# Patient Record
Sex: FEMALE | Race: WHITE | NOT HISPANIC OR LATINO | URBAN - METROPOLITAN AREA
[De-identification: names, ages, dates, MRNs, and addresses within clinical notes are randomized per-mention and may not be internally consistent; named-entity substitution may affect disease eponyms.]

---

## 2018-01-18 PROBLEM — Z00.00 ENCOUNTER FOR PREVENTIVE HEALTH EXAMINATION: Status: ACTIVE | Noted: 2018-01-18

## 2018-01-23 ENCOUNTER — OUTPATIENT (OUTPATIENT)
Dept: OUTPATIENT SERVICES | Facility: HOSPITAL | Age: 78
LOS: 1 days | End: 2018-01-23
Payer: MEDICARE

## 2018-01-23 ENCOUNTER — APPOINTMENT (OUTPATIENT)
Dept: CV DIAGNOSTICS | Facility: HOSPITAL | Age: 78
End: 2018-01-23

## 2018-01-23 DIAGNOSIS — I25.10 ATHEROSCLEROTIC HEART DISEASE OF NATIVE CORONARY ARTERY WITHOUT ANGINA PECTORIS: ICD-10-CM

## 2018-01-23 PROCEDURE — A9500: CPT

## 2018-01-23 PROCEDURE — 78452 HT MUSCLE IMAGE SPECT MULT: CPT

## 2018-01-23 PROCEDURE — 93016 CV STRESS TEST SUPVJ ONLY: CPT

## 2018-01-23 PROCEDURE — 93017 CV STRESS TEST TRACING ONLY: CPT

## 2018-01-23 PROCEDURE — 93018 CV STRESS TEST I&R ONLY: CPT

## 2018-01-23 PROCEDURE — 78452 HT MUSCLE IMAGE SPECT MULT: CPT | Mod: 26

## 2018-02-12 ENCOUNTER — OUTPATIENT (OUTPATIENT)
Dept: OUTPATIENT SERVICES | Facility: HOSPITAL | Age: 78
LOS: 1 days | End: 2018-02-12
Payer: MEDICARE

## 2018-02-12 VITALS
RESPIRATION RATE: 16 BRPM | OXYGEN SATURATION: 97 % | DIASTOLIC BLOOD PRESSURE: 68 MMHG | SYSTOLIC BLOOD PRESSURE: 163 MMHG | WEIGHT: 117.95 LBS | HEART RATE: 66 BPM | TEMPERATURE: 97 F | HEIGHT: 62 IN

## 2018-02-12 DIAGNOSIS — R93.1 ABNORMAL FINDINGS ON DIAGNOSTIC IMAGING OF HEART AND CORONARY CIRCULATION: ICD-10-CM

## 2018-02-12 LAB
ALBUMIN SERPL ELPH-MCNC: 4.5 G/DL — SIGNIFICANT CHANGE UP (ref 3.3–5)
ALP SERPL-CCNC: 78 U/L — SIGNIFICANT CHANGE UP (ref 40–120)
ALT FLD-CCNC: 14 U/L RC — SIGNIFICANT CHANGE UP (ref 10–45)
ANION GAP SERPL CALC-SCNC: 13 MMOL/L — SIGNIFICANT CHANGE UP (ref 5–17)
AST SERPL-CCNC: 20 U/L — SIGNIFICANT CHANGE UP (ref 10–40)
BILIRUB SERPL-MCNC: 0.5 MG/DL — SIGNIFICANT CHANGE UP (ref 0.2–1.2)
BUN SERPL-MCNC: 19 MG/DL — SIGNIFICANT CHANGE UP (ref 7–23)
CALCIUM SERPL-MCNC: 9.5 MG/DL — SIGNIFICANT CHANGE UP (ref 8.4–10.5)
CHLORIDE SERPL-SCNC: 101 MMOL/L — SIGNIFICANT CHANGE UP (ref 96–108)
CO2 SERPL-SCNC: 28 MMOL/L — SIGNIFICANT CHANGE UP (ref 22–31)
CREAT SERPL-MCNC: 0.68 MG/DL — SIGNIFICANT CHANGE UP (ref 0.5–1.3)
GLUCOSE SERPL-MCNC: 97 MG/DL — SIGNIFICANT CHANGE UP (ref 70–99)
HCT VFR BLD CALC: 42.9 % — SIGNIFICANT CHANGE UP (ref 34.5–45)
HGB BLD-MCNC: 14.4 G/DL — SIGNIFICANT CHANGE UP (ref 11.5–15.5)
MCHC RBC-ENTMCNC: 33.5 GM/DL — SIGNIFICANT CHANGE UP (ref 32–36)
MCHC RBC-ENTMCNC: 33.6 PG — SIGNIFICANT CHANGE UP (ref 27–34)
MCV RBC AUTO: 100 FL — SIGNIFICANT CHANGE UP (ref 80–100)
PLATELET # BLD AUTO: 260 K/UL — SIGNIFICANT CHANGE UP (ref 150–400)
POTASSIUM SERPL-MCNC: 3.9 MMOL/L — SIGNIFICANT CHANGE UP (ref 3.5–5.3)
POTASSIUM SERPL-SCNC: 3.9 MMOL/L — SIGNIFICANT CHANGE UP (ref 3.5–5.3)
PROT SERPL-MCNC: 7.3 G/DL — SIGNIFICANT CHANGE UP (ref 6–8.3)
RBC # BLD: 4.27 M/UL — SIGNIFICANT CHANGE UP (ref 3.8–5.2)
RBC # FLD: 11.6 % — SIGNIFICANT CHANGE UP (ref 10.3–14.5)
SODIUM SERPL-SCNC: 142 MMOL/L — SIGNIFICANT CHANGE UP (ref 135–145)
WBC # BLD: 6.2 K/UL — SIGNIFICANT CHANGE UP (ref 3.8–10.5)
WBC # FLD AUTO: 6.2 K/UL — SIGNIFICANT CHANGE UP (ref 3.8–10.5)

## 2018-02-12 PROCEDURE — C1894: CPT

## 2018-02-12 PROCEDURE — 93458 L HRT ARTERY/VENTRICLE ANGIO: CPT | Mod: 26

## 2018-02-12 PROCEDURE — 80053 COMPREHEN METABOLIC PANEL: CPT

## 2018-02-12 PROCEDURE — C1887: CPT

## 2018-02-12 PROCEDURE — 85027 COMPLETE CBC AUTOMATED: CPT

## 2018-02-12 PROCEDURE — 93010 ELECTROCARDIOGRAM REPORT: CPT

## 2018-02-12 PROCEDURE — 99152 MOD SED SAME PHYS/QHP 5/>YRS: CPT

## 2018-02-12 PROCEDURE — 93005 ELECTROCARDIOGRAM TRACING: CPT

## 2018-02-12 PROCEDURE — C1769: CPT

## 2018-02-12 PROCEDURE — 93458 L HRT ARTERY/VENTRICLE ANGIO: CPT

## 2018-02-12 NOTE — H&P CARDIOLOGY - HISTORY OF PRESENT ILLNESS
78 y/o female PMH HTN, macular degeneration, presents c/o progressive exertional dyspnea. Reports symptoms onset upon climbing 1 flight of stairs. Evaluated by cardiologist (currently seeing NP Meggan Phelps as pt reports Dr. Castaneda is no longer with practice). Stress test showed medium sized moderate defects in the distal anteroseptal, apical, and distal lateral walls that are mostly reversible suggestive of moderate ischemia with partial scarring; EF 42% (see report below).  Echo (10/2017) showed EF 60%, evidence of diastolic dysfunction, mild to moderate MR, mild AR. Referred for cardiac catheterization today.  < from: Nuclear Stress Test-Pharmacologic (01.23.18 @ 16:44) >  IMPRESSIONS:Abnormal Study  * Chest Pain: No chest pain with administration of  Regadenoson.  * Symptom: Flushing sensation.  * HR Response: Appropriate.  * BP Response: Appropriate.  * Heart Rhythm: Sinus Rhythm - Frequent VPD's - 75 BPM.  * Baseline ECG: Nonspecific ST-T wave abnormality.  * ECG Changes: During the initial attempt at exercise  stress, there were approximately 1 mm upsloping ST segment  depressions in leads V4-V6 starting at 6:00 min of  exercise at 104 bpm and persisting at least 5:00 min in  recovery.  During pharmacologic stress, there were  approximately 0.5 mm horizontal ST segment depressions in  leads V4-V6 starting at 1:00 min following regadenoson  infusion at 81 bpm and persisting at least 4:00 min in  recovery.  * Arrhythmia: Frequent VPDs occurredduring rest, stress  and recovery.  Ventricular couplets and periods of  ventricular bigeminy occurred during the initial attempt  at exercise stress.  * The left ventricle was enlarged. There are medium-sized,  moderate defects in the distal anteroseptal, apical, and  distal lateral walls that are mostly reversible suggestive  of moderate ischemia with partial scarring.  * There are large, mild defects in the inferior, basal  inferolateral, and basal to mid inferoseptal walls that  are fixed suggestive of scar.  * Post-stress gated wall motion analysis was performed  (LVEF = 42 %;LVEDV = 139 ml.) revealing hypokinesis of the  inferior, basal to mid inferoseptal, basal inferolateral,  apical, and distal anteroseptal walls.  *** No previous Nuclear/Stress exam.    < end of copied text > 78 y/o female PMH HTN, macular degeneration, presents c/o progressive exertional dyspnea. Reports symptoms onset upon climbing 1 flight of stairs. Evaluated by cardiologist (currently seeing NP Meggan Phelps as pt reports Dr. Castaneda is no longer with practice). Stress test showed medium sized moderate defects in the distal anteroseptal, apical, and distal lateral walls that are mostly reversible suggestive of moderate ischemia with partial scarring; EF 42% (see report below).  Echo (10/2017) showed EF 60%, evidence of diastolic dysfunction, mild to moderate MR, mild AR. Referred for cardiac catheterization today.  < from: Nuclear Stress Test-Pharmacologic (01.23.18 @ 16:44) >  IMPRESSIONS:Abnormal Study  * Chest Pain: No chest pain with administration of  Regadenoson.  * Symptom: Flushing sensation.  * HR Response: Appropriate.  * BP Response: Appropriate.  * Heart Rhythm: Sinus Rhythm - Frequent VPD's - 75 BPM.  * Baseline ECG: Nonspecific ST-T wave abnormality.  * ECG Changes: During the initial attempt at exercise  stress, there were approximately 1 mm upsloping ST segment  depressions in leads V4-V6 starting at 6:00 min of  exercise at 104 bpm and persisting at least 5:00 min in  recovery.  During pharmacologic stress, there were  approximately 0.5 mm horizontal ST segment depressions in  leads V4-V6 starting at 1:00 min following regadenoson  infusion at 81 bpm and persisting at least 4:00 min in  recovery.  * Arrhythmia: Frequent VPDs occurredduring rest, stress  and recovery.  Ventricular couplets and periods of  ventricular bigeminy occurred during the initial attempt  at exercise stress.  * The left ventricle was enlarged. There are medium-sized,  moderate defects in the distal anteroseptal, apical, and  distal lateral walls that are mostly reversible suggestive  of moderate ischemia with partial scarring.  * There are large, mild defects in the inferior, basal  inferolateral, and basal to mid inferoseptal walls that  are fixed suggestive of scar.  * Post-stress gated wall motion analysis was performed  (LVEF = 42 %;LVEDV = 139 ml.) revealing hypokinesis of the  inferior, basal to mid inferoseptal, basal inferolateral,  apical, and distal anteroseptal walls.  *** No previous Nuclear/Stress exam.    < end of copied text >     Antianginals: Metoprolol ER, Amlodipine

## 2018-03-12 ENCOUNTER — APPOINTMENT (OUTPATIENT)
Dept: ELECTROPHYSIOLOGY | Facility: CLINIC | Age: 78
End: 2018-03-12

## 2018-03-12 ENCOUNTER — APPOINTMENT (OUTPATIENT)
Dept: ELECTROPHYSIOLOGY | Facility: CLINIC | Age: 78
End: 2018-03-12
Payer: MEDICARE

## 2018-03-12 ENCOUNTER — NON-APPOINTMENT (OUTPATIENT)
Age: 78
End: 2018-03-12

## 2018-03-12 VITALS
BODY MASS INDEX: 20.14 KG/M2 | HEIGHT: 64 IN | SYSTOLIC BLOOD PRESSURE: 157 MMHG | WEIGHT: 118 LBS | OXYGEN SATURATION: 98 % | HEART RATE: 75 BPM | DIASTOLIC BLOOD PRESSURE: 62 MMHG

## 2018-03-12 DIAGNOSIS — Z87.891 PERSONAL HISTORY OF NICOTINE DEPENDENCE: ICD-10-CM

## 2018-03-12 PROCEDURE — 93000 ELECTROCARDIOGRAM COMPLETE: CPT

## 2018-03-12 PROCEDURE — 99205 OFFICE O/P NEW HI 60 MIN: CPT

## 2018-03-12 RX ORDER — MV-MIN/FA/VIT K/LUTEIN/ZEAXANT 200MCG-5MG
CAPSULE ORAL
Refills: 0 | Status: ACTIVE | COMMUNITY

## 2018-04-09 ENCOUNTER — OUTPATIENT (OUTPATIENT)
Dept: OUTPATIENT SERVICES | Facility: HOSPITAL | Age: 78
LOS: 1 days | End: 2018-04-09
Payer: MEDICARE

## 2018-04-09 VITALS
SYSTOLIC BLOOD PRESSURE: 129 MMHG | WEIGHT: 121.92 LBS | RESPIRATION RATE: 20 BRPM | HEART RATE: 68 BPM | TEMPERATURE: 98 F | OXYGEN SATURATION: 97 % | HEIGHT: 62 IN | DIASTOLIC BLOOD PRESSURE: 57 MMHG

## 2018-04-09 DIAGNOSIS — Z01.818 ENCOUNTER FOR OTHER PREPROCEDURAL EXAMINATION: ICD-10-CM

## 2018-04-09 DIAGNOSIS — I49.9 CARDIAC ARRHYTHMIA, UNSPECIFIED: ICD-10-CM

## 2018-04-09 LAB
ALBUMIN SERPL ELPH-MCNC: 4.2 G/DL — SIGNIFICANT CHANGE UP (ref 3.3–5)
ALP SERPL-CCNC: 75 U/L — SIGNIFICANT CHANGE UP (ref 40–120)
ALT FLD-CCNC: 14 U/L RC — SIGNIFICANT CHANGE UP (ref 10–45)
ANION GAP SERPL CALC-SCNC: 12 MMOL/L — SIGNIFICANT CHANGE UP (ref 5–17)
AST SERPL-CCNC: 16 U/L — SIGNIFICANT CHANGE UP (ref 10–40)
BILIRUB SERPL-MCNC: 0.6 MG/DL — SIGNIFICANT CHANGE UP (ref 0.2–1.2)
BLD GP AB SCN SERPL QL: NEGATIVE — SIGNIFICANT CHANGE UP
BUN SERPL-MCNC: 16 MG/DL — SIGNIFICANT CHANGE UP (ref 7–23)
CALCIUM SERPL-MCNC: 9.5 MG/DL — SIGNIFICANT CHANGE UP (ref 8.4–10.5)
CHLORIDE SERPL-SCNC: 103 MMOL/L — SIGNIFICANT CHANGE UP (ref 96–108)
CO2 SERPL-SCNC: 27 MMOL/L — SIGNIFICANT CHANGE UP (ref 22–31)
CREAT SERPL-MCNC: 0.7 MG/DL — SIGNIFICANT CHANGE UP (ref 0.5–1.3)
GLUCOSE SERPL-MCNC: 90 MG/DL — SIGNIFICANT CHANGE UP (ref 70–99)
HCT VFR BLD CALC: 42 % — SIGNIFICANT CHANGE UP (ref 34.5–45)
HGB BLD-MCNC: 13.9 G/DL — SIGNIFICANT CHANGE UP (ref 11.5–15.5)
INR BLD: 1.11 RATIO — SIGNIFICANT CHANGE UP (ref 0.88–1.16)
MCHC RBC-ENTMCNC: 33 GM/DL — SIGNIFICANT CHANGE UP (ref 32–36)
MCHC RBC-ENTMCNC: 33.5 PG — SIGNIFICANT CHANGE UP (ref 27–34)
MCV RBC AUTO: 101 FL — HIGH (ref 80–100)
PLATELET # BLD AUTO: 280 K/UL — SIGNIFICANT CHANGE UP (ref 150–400)
POTASSIUM SERPL-MCNC: 4.1 MMOL/L — SIGNIFICANT CHANGE UP (ref 3.5–5.3)
POTASSIUM SERPL-SCNC: 4.1 MMOL/L — SIGNIFICANT CHANGE UP (ref 3.5–5.3)
PROT SERPL-MCNC: 7.4 G/DL — SIGNIFICANT CHANGE UP (ref 6–8.3)
PROTHROM AB SERPL-ACNC: 12 SEC — SIGNIFICANT CHANGE UP (ref 9.8–12.7)
RBC # BLD: 4.14 M/UL — SIGNIFICANT CHANGE UP (ref 3.8–5.2)
RBC # FLD: 11.7 % — SIGNIFICANT CHANGE UP (ref 10.3–14.5)
RH IG SCN BLD-IMP: POSITIVE — SIGNIFICANT CHANGE UP
SODIUM SERPL-SCNC: 142 MMOL/L — SIGNIFICANT CHANGE UP (ref 135–145)
WBC # BLD: 5 K/UL — SIGNIFICANT CHANGE UP (ref 3.8–10.5)
WBC # FLD AUTO: 5 K/UL — SIGNIFICANT CHANGE UP (ref 3.8–10.5)

## 2018-04-09 PROCEDURE — 93010 ELECTROCARDIOGRAM REPORT: CPT

## 2018-04-09 RX ORDER — METOPROLOL TARTRATE 50 MG
1 TABLET ORAL
Qty: 0 | Refills: 0 | COMMUNITY

## 2018-04-09 NOTE — H&P CARDIOLOGY - HISTORY OF PRESENT ILLNESS
76 y/o female PMH HTN, macular degeneration, and PVCs. On a holter she had approximately 17,000 PVCs. She is on a beta blocker. She notes feeling fatigue. She denies shortness of breath, dizziness, or syncope. She does note palpitations. She can walk a few blocks before getting short of breath. An echocardiogram shows a normal EF. An angiogram shows no coronary disease. seen & evaluated by cardiologist Dr butler & now recommends for VT ablation. 76 y/o female PMH HTN, macular degeneration, and PVCs. On a holter she had approximately 17,000 PVCs. She is on a beta blocker. She notes feeling fatigue. She denies shortness of breath, dizziness, or syncope. She does note palpitations. She can walk a few blocks before getting short of breath. An echocardiogram shows a normal EF. An angiogram shows no coronary disease. seen & evaluated by cardiologist Dr butler & now recommends for VT ablation on 4/17/18.

## 2018-04-17 ENCOUNTER — INPATIENT (INPATIENT)
Facility: HOSPITAL | Age: 78
LOS: 0 days | Discharge: HOME | DRG: 274 | End: 2018-04-18
Attending: INTERNAL MEDICINE | Admitting: INTERNAL MEDICINE
Payer: MEDICARE

## 2018-04-17 ENCOUNTER — TRANSCRIPTION ENCOUNTER (OUTPATIENT)
Age: 78
End: 2018-04-17

## 2018-04-17 VITALS
RESPIRATION RATE: 16 BRPM | HEART RATE: 81 BPM | OXYGEN SATURATION: 97 % | WEIGHT: 121.92 LBS | HEIGHT: 60 IN | DIASTOLIC BLOOD PRESSURE: 54 MMHG | TEMPERATURE: 98 F | SYSTOLIC BLOOD PRESSURE: 141 MMHG

## 2018-04-17 DIAGNOSIS — I49.9 CARDIAC ARRHYTHMIA, UNSPECIFIED: ICD-10-CM

## 2018-04-17 LAB
BLD GP AB SCN SERPL QL: NEGATIVE — SIGNIFICANT CHANGE UP
RH IG SCN BLD-IMP: POSITIVE — SIGNIFICANT CHANGE UP

## 2018-04-17 PROCEDURE — 93654 COMPRE EP EVAL TX VT: CPT

## 2018-04-17 PROCEDURE — 93662 INTRACARDIAC ECG (ICE): CPT | Mod: 26

## 2018-04-17 PROCEDURE — 93010 ELECTROCARDIOGRAM REPORT: CPT

## 2018-04-17 RX ORDER — ASPIRIN/CALCIUM CARB/MAGNESIUM 324 MG
1 TABLET ORAL
Qty: 30 | Refills: 0
Start: 2018-04-17 | End: 2018-05-16

## 2018-04-17 RX ORDER — ASPIRIN/CALCIUM CARB/MAGNESIUM 324 MG
325 TABLET ORAL DAILY
Qty: 0 | Refills: 0 | Status: DISCONTINUED | OUTPATIENT
Start: 2018-04-17 | End: 2018-04-24

## 2018-04-17 RX ORDER — METOPROLOL TARTRATE 50 MG
75 TABLET ORAL DAILY
Qty: 0 | Refills: 0 | Status: DISCONTINUED | OUTPATIENT
Start: 2018-04-17 | End: 2018-04-18

## 2018-04-17 RX ORDER — MAGNESIUM OXIDE 400 MG ORAL TABLET 241.3 MG
800 TABLET ORAL ONCE
Qty: 0 | Refills: 0 | Status: COMPLETED | OUTPATIENT
Start: 2018-04-17 | End: 2018-04-17

## 2018-04-17 RX ORDER — MULTIVIT-MIN/FERROUS GLUCONATE 9 MG/15 ML
1 LIQUID (ML) ORAL DAILY
Qty: 0 | Refills: 0 | Status: DISCONTINUED | OUTPATIENT
Start: 2018-04-17 | End: 2018-04-24

## 2018-04-17 RX ORDER — MULTIVIT-MIN/FERROUS GLUCONATE 9 MG/15 ML
1 LIQUID (ML) ORAL DAILY
Qty: 0 | Refills: 0 | Status: DISCONTINUED | OUTPATIENT
Start: 2018-04-17 | End: 2018-04-18

## 2018-04-17 RX ORDER — AMLODIPINE BESYLATE 2.5 MG/1
5 TABLET ORAL DAILY
Qty: 0 | Refills: 0 | Status: DISCONTINUED | OUTPATIENT
Start: 2018-04-17 | End: 2018-04-18

## 2018-04-17 RX ORDER — ACETAMINOPHEN 500 MG
650 TABLET ORAL EVERY 6 HOURS
Qty: 0 | Refills: 0 | Status: DISCONTINUED | OUTPATIENT
Start: 2018-04-17 | End: 2018-04-18

## 2018-04-17 RX ORDER — ASPIRIN/CALCIUM CARB/MAGNESIUM 324 MG
325 TABLET ORAL DAILY
Qty: 0 | Refills: 0 | Status: DISCONTINUED | OUTPATIENT
Start: 2018-04-17 | End: 2018-04-18

## 2018-04-17 RX ORDER — AMLODIPINE BESYLATE 2.5 MG/1
5 TABLET ORAL DAILY
Qty: 0 | Refills: 0 | Status: DISCONTINUED | OUTPATIENT
Start: 2018-04-17 | End: 2018-04-24

## 2018-04-17 RX ORDER — METOPROLOL TARTRATE 50 MG
75 TABLET ORAL DAILY
Qty: 0 | Refills: 0 | Status: DISCONTINUED | OUTPATIENT
Start: 2018-04-17 | End: 2018-04-24

## 2018-04-17 RX ADMIN — Medication 325 MILLIGRAM(S): at 15:29

## 2018-04-17 RX ADMIN — MAGNESIUM OXIDE 400 MG ORAL TABLET 800 MILLIGRAM(S): 241.3 TABLET ORAL at 21:19

## 2018-04-17 RX ADMIN — AMLODIPINE BESYLATE 5 MILLIGRAM(S): 2.5 TABLET ORAL at 15:29

## 2018-04-17 RX ADMIN — Medication 75 MILLIGRAM(S): at 15:30

## 2018-04-17 NOTE — DISCHARGE NOTE ADULT - ADDITIONAL INSTRUCTIONS
Follow up with Dr. FADIA Kent on 6/1/18 at 9:15am at [facility]

## 2018-04-17 NOTE — DISCHARGE NOTE ADULT - CARE PROVIDER_API CALL
Jonas Kent), Cardiac Electrophysiology; Cardiovascular Disease  [address], [place] [id number]  Phone: [phone]  Fax: [phone]

## 2018-04-17 NOTE — DISCHARGE NOTE ADULT - PLAN OF CARE
Your heartbeat will be controlled. Your catheter/groin site will be free of infection and severe bleeding. Appointments: please call [phone] for follow-up appointment with your electrophysiology (EP) cardiologist within 2-3 weeks after discharge.   Groin Site Care: You can take shower in 24 hours. Keep the area clean and dry. No submersion of your catheter/groin site in bath tubs, pools, or any water for 1 week.   Activity: No driving for 24 hours. No strenuous activity or heavy lifting more than 10 pounds for 1 week.  You may need to: Quit smoking. Limit your alcohol intake.   Call Your Doctor immediately if you have: A fast or irregular heart beat; lightheadedness; severe chest pain or shortness of breath; fever or chills; bleeding, pain, redness, swelling, warmth or drainage at or near the catheter/groin site at [phone] Your blood pressure will be controlled. Continue with your blood pressure medications; eat a heart healthy diet with low salt diet; exercise regularly (consult with your physician or cardiologist first); maintain a heart healthy weight; if you smoke - quit (A resource to help you stop smoking is the [facility] – phone number [phone].); include healthy ways to manage stress. Continue to follow with your primary care physician or cardiologist.

## 2018-04-17 NOTE — DISCHARGE NOTE ADULT - PATIENT PORTAL LINK FT
You can access the [facility] Patient Portal, offered by [facility], by registering with the following website: [email or web address]

## 2018-04-17 NOTE — DISCHARGE NOTE ADULT - CARE PLAN
Principal Discharge DX:	S/P ablation of ventricular arrhythmia  Goal:	Your heartbeat will be controlled. Your catheter/groin site will be free of infection and severe bleeding.  Assessment and plan of treatment:	Appointments: please call [phone] for follow-up appointment with your electrophysiology (EP) cardiologist within 2-3 weeks after discharge.   Groin Site Care: You can take shower in 24 hours. Keep the area clean and dry. No submersion of your catheter/groin site in bath tubs, pools, or any water for 1 week.   Activity: No driving for 24 hours. No strenuous activity or heavy lifting more than 10 pounds for 1 week.  You may need to: Quit smoking. Limit your alcohol intake.   Call Your Doctor immediately if you have: A fast or irregular heart beat; lightheadedness; severe chest pain or shortness of breath; fever or chills; bleeding, pain, redness, swelling, warmth or drainage at or near the catheter/groin site at [phone]  Secondary Diagnosis:	Essential hypertension  Goal:	Your blood pressure will be controlled.  Assessment and plan of treatment:	Continue with your blood pressure medications; eat a heart healthy diet with low salt diet; exercise regularly (consult with your physician or cardiologist first); maintain a heart healthy weight; if you smoke - quit (A resource to help you stop smoking is the [facility] Tobacco [facility] – phone number [phone].); include healthy ways to manage stress. Continue to follow with your primary care physician or cardiologist.

## 2018-04-17 NOTE — DISCHARGE NOTE ADULT - HOSPITAL COURSE
76 y/o female PMH HTN, macular degeneration, and PVCs. On a holter she had approximately 17,000 PVCs. She is on a beta blocker. She notes feeling fatigue. She denies shortness of breath, dizziness, or syncope. She does note palpitations. She can walk a few blocks before getting short of breath. An echocardiogram shows a normal EF. An angiogram shows no coronary disease. seen & evaluated by cardiologist Dr butler & now recommends for VT ablation on 4/17/18.  Pt s/p VT ablation via R groin. Pt tolerated procedure well and overnight remained uneventful. No c/o chest pain or SOB. Pt is hemodynamically stable, EKG and all lab results reviewed. Insertion/incision site benign, no bleeding or hematoma. Discharge teaching provided to Pt/caretaker and verbalized understanding the instruction. Pt is stable for discharge home as per attending.

## 2018-04-17 NOTE — DISCHARGE NOTE ADULT - MEDICATION SUMMARY - MEDICATIONS TO TAKE
I will START or STAY ON the medications listed below when I get home from the hospital:    aspirin 325 mg oral delayed release tablet  -- 1 tab(s) by mouth once a day MDD:1  -- Indication: For Cardiac arrhythmia    metoprolol succinate 50 mg oral tablet, extended release  -- 1.5 tab(s) by mouth once a day ( total 75 mg )   -- Indication: For Cardiac arrhythmia    amLODIPine 5 mg oral tablet  -- 1 tab(s) by mouth once a day  -- Indication: For hypertension    PreserVision AREDS 2 oral capsule  -- 1 cap(s) by mouth 2 times a day  -- Indication: For supplement

## 2018-04-17 NOTE — PROGRESS NOTE ADULT - SUBJECTIVE AND OBJECTIVE BOX
Pre-op Diagnosis: PVCs    Post-op Diagnosis: Same    Procedure: ablation    Electrophysiologist: Jonas Kent    Assistant: None    Anesthesia: see anesthesia record    Access: right femoral vein and artery    Description: ablation    Complications: None    EBL: < 30 cc    Disposition: Stable    Plan:   Bedrest 6 hours   daily  Continue current medications

## 2018-04-18 VITALS
RESPIRATION RATE: 17 BRPM | DIASTOLIC BLOOD PRESSURE: 69 MMHG | SYSTOLIC BLOOD PRESSURE: 117 MMHG | TEMPERATURE: 98 F | HEART RATE: 77 BPM | OXYGEN SATURATION: 97 %

## 2018-04-18 LAB
ANION GAP SERPL CALC-SCNC: 11 MMOL/L — SIGNIFICANT CHANGE UP (ref 5–17)
BUN SERPL-MCNC: 17 MG/DL — SIGNIFICANT CHANGE UP (ref 7–23)
CALCIUM SERPL-MCNC: 9 MG/DL — SIGNIFICANT CHANGE UP (ref 8.4–10.5)
CHLORIDE SERPL-SCNC: 105 MMOL/L — SIGNIFICANT CHANGE UP (ref 96–108)
CO2 SERPL-SCNC: 25 MMOL/L — SIGNIFICANT CHANGE UP (ref 22–31)
CREAT SERPL-MCNC: 0.63 MG/DL — SIGNIFICANT CHANGE UP (ref 0.5–1.3)
GLUCOSE SERPL-MCNC: 91 MG/DL — SIGNIFICANT CHANGE UP (ref 70–99)
HCT VFR BLD CALC: 39.2 % — SIGNIFICANT CHANGE UP (ref 34.5–45)
HGB BLD-MCNC: 13 G/DL — SIGNIFICANT CHANGE UP (ref 11.5–15.5)
MCHC RBC-ENTMCNC: 33.3 GM/DL — SIGNIFICANT CHANGE UP (ref 32–36)
MCHC RBC-ENTMCNC: 33.6 PG — SIGNIFICANT CHANGE UP (ref 27–34)
MCV RBC AUTO: 101 FL — HIGH (ref 80–100)
PLATELET # BLD AUTO: 224 K/UL — SIGNIFICANT CHANGE UP (ref 150–400)
POTASSIUM SERPL-MCNC: 4.1 MMOL/L — SIGNIFICANT CHANGE UP (ref 3.5–5.3)
POTASSIUM SERPL-SCNC: 4.1 MMOL/L — SIGNIFICANT CHANGE UP (ref 3.5–5.3)
RBC # BLD: 3.89 M/UL — SIGNIFICANT CHANGE UP (ref 3.8–5.2)
RBC # FLD: 11.8 % — SIGNIFICANT CHANGE UP (ref 10.3–14.5)
SODIUM SERPL-SCNC: 141 MMOL/L — SIGNIFICANT CHANGE UP (ref 135–145)
WBC # BLD: 6.4 K/UL — SIGNIFICANT CHANGE UP (ref 3.8–10.5)
WBC # FLD AUTO: 6.4 K/UL — SIGNIFICANT CHANGE UP (ref 3.8–10.5)

## 2018-04-18 PROCEDURE — C1732: CPT

## 2018-04-18 PROCEDURE — 86900 BLOOD TYPING SEROLOGIC ABO: CPT

## 2018-04-18 PROCEDURE — 85027 COMPLETE CBC AUTOMATED: CPT

## 2018-04-18 PROCEDURE — 86850 RBC ANTIBODY SCREEN: CPT

## 2018-04-18 PROCEDURE — 93654 COMPRE EP EVAL TX VT: CPT

## 2018-04-18 PROCEDURE — 93005 ELECTROCARDIOGRAM TRACING: CPT

## 2018-04-18 PROCEDURE — 93010 ELECTROCARDIOGRAM REPORT: CPT

## 2018-04-18 PROCEDURE — 93662 INTRACARDIAC ECG (ICE): CPT

## 2018-04-18 PROCEDURE — C1894: CPT

## 2018-04-18 PROCEDURE — 85610 PROTHROMBIN TIME: CPT

## 2018-04-18 PROCEDURE — 86901 BLOOD TYPING SEROLOGIC RH(D): CPT

## 2018-04-18 PROCEDURE — C1759: CPT

## 2018-04-18 PROCEDURE — 80053 COMPREHEN METABOLIC PANEL: CPT

## 2018-04-18 PROCEDURE — 80048 BASIC METABOLIC PNL TOTAL CA: CPT

## 2018-04-18 RX ADMIN — Medication 325 MILLIGRAM(S): at 05:32

## 2018-04-18 RX ADMIN — AMLODIPINE BESYLATE 5 MILLIGRAM(S): 2.5 TABLET ORAL at 05:31

## 2018-04-18 RX ADMIN — Medication 75 MILLIGRAM(S): at 05:31

## 2018-04-18 NOTE — PROGRESS NOTE ADULT - SUBJECTIVE AND OBJECTIVE BOX
Subjective/Objective:  Patient is a 77y old  Female who presents with a chief complaint of VT ablation (17 Apr 2018 22:55)    Allergies    No Known Allergies    Intolerances      Medications:  acetaminophen   Tablet. 650 milliGRAM(s) Oral every 6 hours PRN  amLODIPine   Tablet 5 milliGRAM(s) Oral daily  aspirin enteric coated 325 milliGRAM(s) Oral daily  metoprolol succinate ER 75 milliGRAM(s) Oral daily  multivitamin/minerals 1 Tablet(s) Oral daily      Review of Systems:   No c/o chest pain or SOB, and all others negative.    Vitals:  T(C): 36.9 (04-17-18 @ 20:35), Max: 36.9 (04-17-18 @ 20:35)  HR: 65 (04-18-18 @ 05:28) (61 - 86)  BP: 113/53 (04-18-18 @ 05:28) (108/47 - 141/54)  BP(mean): --  RR: 16 (04-18-18 @ 05:28) (16 - 17)  SpO2: 95% (04-18-18 @ 05:28) (94% - 98%)  Wt(kg): --  Daily Height in cm: 152.4 (17 Apr 2018 13:35)    Daily   I&O's Summary    17 Apr 2018 07:01  -  18 Apr 2018 06:13  --------------------------------------------------------  IN: 420 mL / OUT: 0 mL / NET: 420 mL      Physical Exam:  Appearance: Pt in NAD, non-toxic  Cardiovascular: S1 S2  Cath Site: No evidence of bleeding or hematoma, Non-tender to palpation, 2+ distal pulses  Respiratory: Clear to auscultation bilaterally  Gastrointestinal: Soft, NT/ND, Bowel Sounds +  Neurologic: Non-focal                          13.0   6.4   )-----------( 224      ( 18 Apr 2018 04:54 )             39.2     04-18    141  |  105  |  17  ----------------------------<  91  4.1   |  25  |  0.63    Ca    9.0      18 Apr 2018 04:54              Lipid panel   Hgb A1c     ECG: SR at HR 66 with PVC's, no significant changes from previous EKG.    Interpretation of Telemetry: SB/SR at HR 50-60's with frequent PVC's.  6 beats WC x1 overnight, but asymptomatic, already on BB.     Assessment/Plan:   S/P VT ablation via R groin. Pt tolerated procedure well and overnight remained uneventful. No c/o chest pain or SOB. Pt is hemodynamically stable, EKG and all lab results reviewed, and unremarkable. Insertion/incision site benign, no bleeding or hematoma. Discharge teaching provided to Pt/caretaker and verbalized understanding the instruction. Pt is stable.   F/U with cardiologist in 1-2 weeks.  Plan to see attending this morning, then may d/c home if stable.  cont assess and monitor.

## 2018-05-09 ENCOUNTER — OUTPATIENT (OUTPATIENT)
Dept: OUTPATIENT SERVICES | Facility: HOSPITAL | Age: 78
LOS: 1 days | End: 2018-05-09
Payer: MEDICARE

## 2018-05-09 ENCOUNTER — APPOINTMENT (OUTPATIENT)
Dept: RADIOLOGY | Facility: IMAGING CENTER | Age: 78
End: 2018-05-09
Payer: MEDICARE

## 2018-05-09 DIAGNOSIS — Z00.8 ENCOUNTER FOR OTHER GENERAL EXAMINATION: ICD-10-CM

## 2018-05-09 PROCEDURE — 71046 X-RAY EXAM CHEST 2 VIEWS: CPT | Mod: 26

## 2018-05-09 PROCEDURE — 71046 X-RAY EXAM CHEST 2 VIEWS: CPT

## 2018-06-04 ENCOUNTER — NON-APPOINTMENT (OUTPATIENT)
Age: 78
End: 2018-06-04

## 2018-06-04 ENCOUNTER — APPOINTMENT (OUTPATIENT)
Dept: ELECTROPHYSIOLOGY | Facility: CLINIC | Age: 78
End: 2018-06-04
Payer: MEDICARE

## 2018-06-04 VITALS
BODY MASS INDEX: 20.14 KG/M2 | HEIGHT: 64 IN | DIASTOLIC BLOOD PRESSURE: 74 MMHG | OXYGEN SATURATION: 98 % | SYSTOLIC BLOOD PRESSURE: 136 MMHG | HEART RATE: 61 BPM | WEIGHT: 118 LBS

## 2018-06-04 DIAGNOSIS — I47.2 VENTRICULAR TACHYCARDIA: ICD-10-CM

## 2018-06-04 PROCEDURE — 93000 ELECTROCARDIOGRAM COMPLETE: CPT

## 2018-06-04 PROCEDURE — 99214 OFFICE O/P EST MOD 30 MIN: CPT

## 2019-05-07 PROBLEM — G40.909 EPILEPSY, UNSPECIFIED, NOT INTRACTABLE, WITHOUT STATUS EPILEPTICUS: Chronic | Status: ACTIVE | Noted: 2018-02-12

## 2019-05-09 PROBLEM — Z86.69 HISTORY OF MACULAR DEGENERATION: Status: RESOLVED | Noted: 2019-05-09 | Resolved: 2019-05-09

## 2019-05-09 PROBLEM — I34.0 SEVERE MITRAL INSUFFICIENCY: Status: ACTIVE | Noted: 2019-05-09

## 2019-05-09 PROBLEM — Z86.79 HISTORY OF HYPERTENSION: Status: RESOLVED | Noted: 2019-05-09 | Resolved: 2019-05-09

## 2019-05-09 RX ORDER — ATORVASTATIN CALCIUM 20 MG/1
20 TABLET, FILM COATED ORAL
Refills: 2 | Status: ACTIVE | COMMUNITY

## 2019-05-09 RX ORDER — ASPIRIN 325 MG/1
325 TABLET, COATED ORAL
Refills: 0 | Status: COMPLETED | COMMUNITY
End: 2019-05-09

## 2019-05-09 RX ORDER — ASPIRIN 81 MG
81 TABLET, DELAYED RELEASE (ENTERIC COATED) ORAL DAILY
Qty: 1 | Refills: 5 | Status: ACTIVE | COMMUNITY

## 2019-05-14 ENCOUNTER — APPOINTMENT (OUTPATIENT)
Dept: CARDIOTHORACIC SURGERY | Facility: CLINIC | Age: 79
End: 2019-05-14
Payer: MEDICARE

## 2019-05-14 VITALS
OXYGEN SATURATION: 97 % | HEIGHT: 64 IN | BODY MASS INDEX: 20.49 KG/M2 | RESPIRATION RATE: 18 BRPM | WEIGHT: 120 LBS | DIASTOLIC BLOOD PRESSURE: 63 MMHG | HEART RATE: 60 BPM | SYSTOLIC BLOOD PRESSURE: 135 MMHG

## 2019-05-14 DIAGNOSIS — Z86.69 PERSONAL HISTORY OF OTHER DISEASES OF THE NERVOUS SYSTEM AND SENSE ORGANS: ICD-10-CM

## 2019-05-14 DIAGNOSIS — Z86.79 PERSONAL HISTORY OF OTHER DISEASES OF THE CIRCULATORY SYSTEM: ICD-10-CM

## 2019-05-14 DIAGNOSIS — I34.0 NONRHEUMATIC MITRAL (VALVE) INSUFFICIENCY: ICD-10-CM

## 2019-05-14 PROCEDURE — 99205 OFFICE O/P NEW HI 60 MIN: CPT

## 2019-05-15 NOTE — CONSULT LETTER
[FreeTextEntry2] : Pascual Hernandez M.D. [FreeTextEntry1] : Please find attached our consultation on your patient, LIZZETTE SANCHEZ \par We take a multidisciplinary team approach to patient care and consider you, the referring physician, an extension of our team. We will maintain an open line of communication with you throughout your patient's treatment course.  \par It is our commitment to provide your patient with the highest quality of advanced therapeutic options. We thank you for allowing us to participate in the care of your patient.\par Please do not hesitate to contact our team with any questions or concerns at [phone].\par  [FreeTextEntry3] : Joao Kent MD, FRCS\par \par [facility] \par Department of Cardiothoracic  Surgery \par Director of Robotic Cardiac Surgery\par Professor of Cardiovascular & Thoracic Surgery\par [employer] \par \par [facility]\par

## 2019-05-15 NOTE — REVIEW OF SYSTEMS
[Eyesight Problems] : eyesight problems [SOB on Exertion] : shortness of breath during exertion [Negative] : Heme/Lymph

## 2019-05-15 NOTE — ASSESSMENT
[FreeTextEntry1] : 78 year old female with history of HTN and mitral valve prolapse presents with severe MR and worsening MO for surgical consult. Dr. Kent reviewed the echocardiogram images with the patient and  her .  Dr. Kent discussed the risks, benefits and alternatives to surgery and the various surgical approaches, minimally invasive versus sternotomy.    Risks include but not limited to death, heart attack, bleeding, stroke, kidney problems and infection.  Dr. Kent quoted a 1% operative mortality and complication risk. He discussed that there is 95% chance that her valve can repaired. Dr. Kent feels the patient will benefit from and is a candidate for minimally invasive mitral valve repair, possible TV repair, possible cryoablation MAZE.  All questions were addressed and the patient agrees to proceed with surgery. \par \par Plan: \par Admit on 5/21 for PST including non contrast chest CT scan, PFT's, Carotids, Labs, EKG\par OR on 5/22\par \par

## 2019-05-15 NOTE — PHYSICAL EXAM
[General Appearance - Alert] : alert [Sclera] : the sclera and conjunctiva were normal [General Appearance - In No Acute Distress] : in no acute distress [PERRL With Normal Accommodation] : pupils were equal in size, round, and reactive to light [Extraocular Movements] : extraocular movements were intact [Oropharynx] : the oropharynx was normal [Outer Ear] : the ears and nose were normal in appearance [Neck Appearance] : the appearance of the neck was normal [Neck Cervical Mass (___cm)] : no neck mass was observed [Jugular Venous Distention Increased] : there was no jugular-venous distention [Thyroid Diffuse Enlargement] : the thyroid was not enlarged [Thyroid Nodule] : there were no palpable thyroid nodules [Auscultation Breath Sounds / Voice Sounds] : lungs were clear to auscultation bilaterally [Apical Impulse] : the apical impulse was normal [Heart Rate And Rhythm] : heart rate was normal and rhythm regular [Examination Of The Chest] : the chest was normal in appearance [Chest Visual Inspection Thoracic Asymmetry] : no chest asymmetry [Diminished Respiratory Excursion] : normal chest expansion [2+] : left 2+ [No Abnormalities] : the abdominal aorta was not enlarged and no bruit was heard [Bowel Sounds] : normal bowel sounds [Abdomen Soft] : soft [Abdomen Tenderness] : non-tender [Abdomen Mass (___ Cm)] : no abdominal mass palpated [No CVA Tenderness] : no ~M costovertebral angle tenderness [No Spinal Tenderness] : no spinal tenderness [Abnormal Walk] : normal gait [Nail Clubbing] : no clubbing  or cyanosis of the fingernails [Musculoskeletal - Swelling] : no joint swelling seen [Motor Tone] : muscle strength and tone were normal [Skin Color & Pigmentation] : normal skin color and pigmentation [Skin Turgor] : normal skin turgor [] : no rash [Deep Tendon Reflexes (DTR)] : deep tendon reflexes were 2+ and symmetric [No Focal Deficits] : no focal deficits [Sensation] : the sensory exam was normal to light touch and pinprick [Oriented To Time, Place, And Person] : oriented to person, place, and time [Impaired Insight] : insight and judgment were intact [Affect] : the affect was normal [FreeTextEntry1] : 3/6  @ apex [Right Carotid Bruit] : no bruit heard over the right carotid [Left Carotid Bruit] : no bruit heard over the left carotid [Right Femoral Bruit] : no bruit heard over the right femoral artery [Left Femoral Bruit] : no bruit heard over the left femoral artery

## 2019-05-15 NOTE — DATA REVIEWED
[FreeTextEntry1] : 4/24/19 Echo: normal LV size and function, LVEF 55-60%, diastolic dysfunction, normal RV size and function, severe LA enlargement, mild AI, severe MR, mild TR, PAS 29 mmHg. \par \par 2/12/18 Cardiac Cath: normal coronaries\par \par 20/2017: Echo stress: mild stress-induced ventricular arrhythmias(frequent PVC's, bigeminy, couplet's and 3 beat NSVT). \par \par 10/30/17 Echo: LVEF 60%, diatolic LV dysfunction, normal RV size and systolic function, severe LA enlargement, mild to moderate MR. Prolapse of the posterior mitral valve leaflet, minimal TR and MD, PAS 25-30 mmHg. \par \par \par

## 2019-05-21 ENCOUNTER — INPATIENT (INPATIENT)
Facility: HOSPITAL | Age: 79
LOS: 7 days | Discharge: HOME | DRG: 220 | End: 2019-05-29
Attending: THORACIC SURGERY (CARDIOTHORACIC VASCULAR SURGERY) | Admitting: THORACIC SURGERY (CARDIOTHORACIC VASCULAR SURGERY)
Payer: MEDICARE

## 2019-05-21 VITALS
DIASTOLIC BLOOD PRESSURE: 67 MMHG | OXYGEN SATURATION: 96 % | SYSTOLIC BLOOD PRESSURE: 144 MMHG | HEART RATE: 60 BPM | RESPIRATION RATE: 16 BRPM

## 2019-05-21 LAB
ALBUMIN SERPL ELPH-MCNC: 4.3 G/DL — SIGNIFICANT CHANGE UP (ref 3.3–5)
ALP SERPL-CCNC: 79 U/L — SIGNIFICANT CHANGE UP (ref 40–120)
ALT FLD-CCNC: 19 U/L — SIGNIFICANT CHANGE UP (ref 10–45)
ANION GAP SERPL CALC-SCNC: 9 MMOL/L — SIGNIFICANT CHANGE UP (ref 5–17)
APPEARANCE UR: CLEAR — SIGNIFICANT CHANGE UP
APTT BLD: 31.8 SEC — SIGNIFICANT CHANGE UP (ref 27.5–36.3)
AST SERPL-CCNC: 21 U/L — SIGNIFICANT CHANGE UP (ref 10–40)
BASOPHILS # BLD AUTO: 0.02 K/UL — SIGNIFICANT CHANGE UP (ref 0–0.2)
BASOPHILS NFR BLD AUTO: 0.3 % — SIGNIFICANT CHANGE UP (ref 0–2)
BILIRUB SERPL-MCNC: 0.4 MG/DL — SIGNIFICANT CHANGE UP (ref 0.2–1.2)
BILIRUB UR-MCNC: NEGATIVE — SIGNIFICANT CHANGE UP
BUN SERPL-MCNC: 17 MG/DL — SIGNIFICANT CHANGE UP (ref 7–23)
CALCIUM SERPL-MCNC: 9 MG/DL — SIGNIFICANT CHANGE UP (ref 8.4–10.5)
CHLORIDE SERPL-SCNC: 105 MMOL/L — SIGNIFICANT CHANGE UP (ref 96–108)
CO2 SERPL-SCNC: 28 MMOL/L — SIGNIFICANT CHANGE UP (ref 22–31)
COLOR SPEC: YELLOW — SIGNIFICANT CHANGE UP
CREAT SERPL-MCNC: 0.58 MG/DL — SIGNIFICANT CHANGE UP (ref 0.5–1.3)
DIFF PNL FLD: ABNORMAL
EOSINOPHIL # BLD AUTO: 0.05 K/UL — SIGNIFICANT CHANGE UP (ref 0–0.5)
EOSINOPHIL NFR BLD AUTO: 0.8 % — SIGNIFICANT CHANGE UP (ref 0–6)
GLUCOSE SERPL-MCNC: 97 MG/DL — SIGNIFICANT CHANGE UP (ref 70–99)
GLUCOSE UR QL: NEGATIVE — SIGNIFICANT CHANGE UP
HBA1C BLD-MCNC: 5.6 % — SIGNIFICANT CHANGE UP (ref 4–5.6)
HCT VFR BLD CALC: 40.4 % — SIGNIFICANT CHANGE UP (ref 34.5–45)
HGB BLD-MCNC: 13 G/DL — SIGNIFICANT CHANGE UP (ref 11.5–15.5)
IMM GRANULOCYTES NFR BLD AUTO: 0.3 % — SIGNIFICANT CHANGE UP (ref 0–1.5)
INR BLD: 0.99 — SIGNIFICANT CHANGE UP (ref 0.88–1.16)
KETONES UR-MCNC: NEGATIVE — SIGNIFICANT CHANGE UP
LEUKOCYTE ESTERASE UR-ACNC: NEGATIVE — SIGNIFICANT CHANGE UP
LYMPHOCYTES # BLD AUTO: 1.73 K/UL — SIGNIFICANT CHANGE UP (ref 1–3.3)
LYMPHOCYTES # BLD AUTO: 27.2 % — SIGNIFICANT CHANGE UP (ref 13–44)
MCHC RBC-ENTMCNC: 32.2 GM/DL — SIGNIFICANT CHANGE UP (ref 32–36)
MCHC RBC-ENTMCNC: 32.7 PG — SIGNIFICANT CHANGE UP (ref 27–34)
MCV RBC AUTO: 101.5 FL — HIGH (ref 80–100)
MONOCYTES # BLD AUTO: 0.58 K/UL — SIGNIFICANT CHANGE UP (ref 0–0.9)
MONOCYTES NFR BLD AUTO: 9.1 % — SIGNIFICANT CHANGE UP (ref 2–14)
NEUTROPHILS # BLD AUTO: 3.95 K/UL — SIGNIFICANT CHANGE UP (ref 1.8–7.4)
NEUTROPHILS NFR BLD AUTO: 62.3 % — SIGNIFICANT CHANGE UP (ref 43–77)
NITRITE UR-MCNC: NEGATIVE — SIGNIFICANT CHANGE UP
NRBC # BLD: 0 /100 WBCS — SIGNIFICANT CHANGE UP (ref 0–0)
NT-PROBNP SERPL-SCNC: 682 PG/ML — HIGH (ref 0–300)
PH UR: 7 — SIGNIFICANT CHANGE UP (ref 5–8)
PLATELET # BLD AUTO: 253 K/UL — SIGNIFICANT CHANGE UP (ref 150–400)
POTASSIUM SERPL-MCNC: 4.2 MMOL/L — SIGNIFICANT CHANGE UP (ref 3.5–5.3)
POTASSIUM SERPL-SCNC: 4.2 MMOL/L — SIGNIFICANT CHANGE UP (ref 3.5–5.3)
PREALB SERPL-MCNC: 25 MG/DL — SIGNIFICANT CHANGE UP (ref 20–40)
PROT SERPL-MCNC: 7.1 G/DL — SIGNIFICANT CHANGE UP (ref 6–8.3)
PROT UR-MCNC: NEGATIVE MG/DL — SIGNIFICANT CHANGE UP
PROTHROM AB SERPL-ACNC: 11.2 SEC — SIGNIFICANT CHANGE UP (ref 10–12.9)
RBC # BLD: 3.98 M/UL — SIGNIFICANT CHANGE UP (ref 3.8–5.2)
RBC # FLD: 13.4 % — SIGNIFICANT CHANGE UP (ref 10.3–14.5)
SODIUM SERPL-SCNC: 142 MMOL/L — SIGNIFICANT CHANGE UP (ref 135–145)
SP GR SPEC: 1.01 — SIGNIFICANT CHANGE UP (ref 1–1.03)
TSH SERPL-MCNC: 0.75 UIU/ML — SIGNIFICANT CHANGE UP (ref 0.35–4.94)
UROBILINOGEN FLD QL: 0.2 E.U./DL — SIGNIFICANT CHANGE UP
WBC # BLD: 6.35 K/UL — SIGNIFICANT CHANGE UP (ref 3.8–10.5)
WBC # FLD AUTO: 6.35 K/UL — SIGNIFICANT CHANGE UP (ref 3.8–10.5)

## 2019-05-21 PROCEDURE — 93880 EXTRACRANIAL BILAT STUDY: CPT | Mod: 26

## 2019-05-21 PROCEDURE — 71046 X-RAY EXAM CHEST 2 VIEWS: CPT | Mod: 26

## 2019-05-21 PROCEDURE — 71250 CT THORAX DX C-: CPT | Mod: 26

## 2019-05-21 RX ORDER — HEPARIN SODIUM 5000 [USP'U]/ML
5000 INJECTION INTRAVENOUS; SUBCUTANEOUS EVERY 8 HOURS
Refills: 0 | Status: DISCONTINUED | OUTPATIENT
Start: 2019-05-21 | End: 2019-05-22

## 2019-05-21 RX ORDER — ATORVASTATIN CALCIUM 80 MG/1
20 TABLET, FILM COATED ORAL AT BEDTIME
Refills: 0 | Status: DISCONTINUED | OUTPATIENT
Start: 2019-05-21 | End: 2019-05-22

## 2019-05-21 RX ORDER — CHLORHEXIDINE GLUCONATE 213 G/1000ML
1 SOLUTION TOPICAL ONCE
Refills: 0 | Status: COMPLETED | OUTPATIENT
Start: 2019-05-21 | End: 2019-05-21

## 2019-05-21 RX ORDER — PANTOPRAZOLE SODIUM 20 MG/1
40 TABLET, DELAYED RELEASE ORAL
Refills: 0 | Status: DISCONTINUED | OUTPATIENT
Start: 2019-05-21 | End: 2019-05-22

## 2019-05-21 RX ORDER — ASPIRIN/CALCIUM CARB/MAGNESIUM 324 MG
81 TABLET ORAL DAILY
Refills: 0 | Status: DISCONTINUED | OUTPATIENT
Start: 2019-05-21 | End: 2019-05-22

## 2019-05-21 RX ORDER — CHLORHEXIDINE GLUCONATE 213 G/1000ML
5 SOLUTION TOPICAL ONCE
Refills: 0 | Status: COMPLETED | OUTPATIENT
Start: 2019-05-21 | End: 2019-05-22

## 2019-05-21 RX ORDER — METOPROLOL TARTRATE 50 MG
25 TABLET ORAL EVERY 12 HOURS
Refills: 0 | Status: DISCONTINUED | OUTPATIENT
Start: 2019-05-21 | End: 2019-05-22

## 2019-05-21 RX ORDER — CHLORHEXIDINE GLUCONATE 213 G/1000ML
1 SOLUTION TOPICAL ONCE
Refills: 0 | Status: COMPLETED | OUTPATIENT
Start: 2019-05-22 | End: 2019-05-22

## 2019-05-21 RX ORDER — SODIUM CHLORIDE 9 MG/ML
3 INJECTION INTRAMUSCULAR; INTRAVENOUS; SUBCUTANEOUS EVERY 8 HOURS
Refills: 0 | Status: DISCONTINUED | OUTPATIENT
Start: 2019-05-21 | End: 2019-05-22

## 2019-05-21 RX ADMIN — ATORVASTATIN CALCIUM 20 MILLIGRAM(S): 80 TABLET, FILM COATED ORAL at 22:52

## 2019-05-21 RX ADMIN — CHLORHEXIDINE GLUCONATE 1 APPLICATION(S): 213 SOLUTION TOPICAL at 20:00

## 2019-05-21 RX ADMIN — SODIUM CHLORIDE 3 MILLILITER(S): 9 INJECTION INTRAMUSCULAR; INTRAVENOUS; SUBCUTANEOUS at 22:11

## 2019-05-21 RX ADMIN — Medication 25 MILLIGRAM(S): at 19:22

## 2019-05-21 RX ADMIN — HEPARIN SODIUM 5000 UNIT(S): 5000 INJECTION INTRAVENOUS; SUBCUTANEOUS at 22:35

## 2019-05-21 RX ADMIN — CHLORHEXIDINE GLUCONATE 1 APPLICATION(S): 213 SOLUTION TOPICAL at 22:34

## 2019-05-21 RX ADMIN — SODIUM CHLORIDE 3 MILLILITER(S): 9 INJECTION INTRAMUSCULAR; INTRAVENOUS; SUBCUTANEOUS at 14:39

## 2019-05-21 RX ADMIN — Medication 81 MILLIGRAM(S): at 19:22

## 2019-05-21 NOTE — H&P ADULT - HISTORY OF PRESENT ILLNESS
77 y/o female with PMHx of HTN, s/p ablation at [facility] in 2018 for >17,000 PVC on holter monitor now presents for her known mitral valve regurgitation/prolapse. Patient reports worsening MO and exercise tolerance of 2 blocks. She had a + stress test in Jan 2018 and a LHC on 2/12/18 showing normal coronary arteries. Her last echo on 4/24 showed severe MR with prolapse of the posterior mitral leaflet. and severe eccentric MR, and mild TR, normal LV funciton and severe LA enlargement. the degree of MR had significantly worsened from echo's done in 2018 and 2017. She was seen by Dr. Kent in the office and was found to be a surgical candidate. She now presents for her completion of her preoperative workup including carotid duples, xray, non contrast chest CT. She states that she has been in her usual state of health.... 77 y/o female with PMHx of HTN, s/p ablation at [facility] in 2018 for >17,000 PVC on holter monitor now presents for her known mitral valve regurgitation/prolapse. Patient reports worsening MO and exercise tolerance of 2 blocks. She had a + stress test in Jan 2018 and a LHC on 2/12/18 showing normal coronary arteries. Her last echo on 4/24 showed severe MR with prolapse of the posterior mitral leaflet. and severe eccentric MR, and mild TR, normal LV funciton and severe LA enlargement. the degree of MR had significantly worsened from echo's done in 2018 and 2017. She was seen by Dr. Kent in the office and was found to be a surgical candidate. She now presents for her completion of her preoperative workup including carotid duples, xray, non contrast chest CT. She states that she has been in her usual state of health. She states that she has some fatigue and MO after walking a few blocks. She denies any CP, palpitations, orthopnea, LE edema, fevers/chills, nausea/vomiting.

## 2019-05-21 NOTE — H&P ADULT - NSHPREVIEWOFSYSTEMS_GEN_ALL_CORE
Review of Systems  CONSTITUTIONAL:  Denies Fevers / chills, sweats, fatigue, weight loss, weight gain                                      NEURO:  Denies parathesias, seizures, syncope, confusion                                                                                EYES:  Denies Blurry vision, discharge, pain, loss of vision                                                                                    ENMT:  Denies Difficulty hearing, vertigo, dysphagia, epistaxis, recent dental work                                       CV:  Denies Chest pain, palpitations, MO, orthopnea                                                                                          RESPIRATORY:  Denies Wheezing, SOB, cough / sputum, hemoptysis                                                                GI:  Denies Nausea, vommiting, diarrhea, constipation, melena, difficulty swallowing                                               : Denies Hematuria, dysuria, urgency, incontinence                                                                                         MUSKULOSKELETAL:  Denies arthritis, joint swelling, muscle weakness                                                             SKIN/BREAST:  Denies rash, itching, selvin loss, masses                                                                                            PSYCH:  Denies depresion, anxiety, suicidal ideation                                                                                               HEME/LYMPH:  Denies bruises easily, enlarged lymph nodes, tender lymph nodes                                        ENDOCRINE:  Denies cold intolerance, heat intolerance, polydipsia

## 2019-05-21 NOTE — H&P ADULT - ASSESSMENT
77 y/o female with PMHx of HTN, s/p ablation at [facility] in 2018 for >17,000 PVC on holter monitor now presents for her known mitral valve regurgitation/prolapse. Patient reports worsening MO and exercise tolerance of 2 blocks. She had a + stress test in Jan 2018 and a LHC on 2/12/18 showing normal coronary arteries. Her last echo on 4/24 showed severe MR with prolapse of the posterior mitral leaflet. and severe eccentric MR, and mild TR, normal LV funciton and severe LA enlargement. the degree of MR had significantly worsened from echo's done in 2018 and 2017. She was seen by Dr. Kent in the office and was found to be a surgical candidate. She now presents for her completion of her preoperative workup including carotid duples, xray, non contrast chest CT    - CT chest, carotid doppler, xray, EKG performed  - labs reviewed   - consent obtained  - T& S performed, blood products on hold  - for MV Repair/replacement, possible tricuspid valve, possible cryomaze in AM, possible mini sternotomy

## 2019-05-21 NOTE — H&P ADULT - NSICDXPASTMEDICALHX_GEN_ALL_CORE_FT
PAST MEDICAL HISTORY:  Arrhythmia     Epilepsy     Former smoker     HTN (hypertension)     Macular degeneration

## 2019-05-21 NOTE — H&P ADULT - NSHPSOCIALHISTORY_GEN_ALL_CORE
hx of smoking quit 45 years ago. 5 PY history, no alcohol use, no illicit drug use   no assistive device  lives with

## 2019-05-21 NOTE — PATIENT PROFILE ADULT - NSPROMEDSBROUGHTTOHOSP_GEN_A_NUR
Shana Patel as her PCP. yes

## 2019-05-21 NOTE — H&P ADULT - NSHPPHYSICALEXAM_GEN_ALL_CORE
Physical Exam  CONSTITUTIONAL:                                                              WNL  NEURO:                                                                       WNL                      EYES:                                                                                WNL  ENMT:                                                                               WNL  CV:                                                                                   WNL  RESPIRATORY:                                                                 WNL  GI:                                                                                     WNL  : FITZPATRICK + / -                                                                  WNL  MUSKULOSKELETAL:                                                       WNL  SKIN / BREAST:                                                                  WNL Physical Exam  CONSTITUTIONAL: NAD, stable   NEURO: A&O x3, no focal deficits                EYES:  EOMI, PERRLA,   ENMT:  normocephalic, atraumatic, no JVD, no carotid bruit  CV: RRR, +I/ VI systolic murmur  RESPIRATORY:   CTA b/l  GI: +BS, soft, nontender  : no zhong   MUSKULOSKELETAL: RUE unable to straighten arm 2/2 arthritis, 5/5 strength UE and LE   SKIN / BREAST: no lacerations/abrasions  Vasc: no LE edema b/l 2+ pulses UE and LE expect PT 1+

## 2019-05-22 ENCOUNTER — APPOINTMENT (OUTPATIENT)
Dept: CARDIOTHORACIC SURGERY | Facility: HOSPITAL | Age: 79
End: 2019-05-22

## 2019-05-22 LAB
ALBUMIN SERPL ELPH-MCNC: 2.7 G/DL — LOW (ref 3.3–5)
ALBUMIN SERPL ELPH-MCNC: 3.8 G/DL — SIGNIFICANT CHANGE UP (ref 3.3–5)
ALBUMIN SERPL ELPH-MCNC: 3.9 G/DL — SIGNIFICANT CHANGE UP (ref 3.3–5)
ALBUMIN SERPL ELPH-MCNC: 4.1 G/DL — SIGNIFICANT CHANGE UP (ref 3.3–5)
ALP SERPL-CCNC: 44 U/L — SIGNIFICANT CHANGE UP (ref 40–120)
ALP SERPL-CCNC: 47 U/L — SIGNIFICANT CHANGE UP (ref 40–120)
ALP SERPL-CCNC: 48 U/L — SIGNIFICANT CHANGE UP (ref 40–120)
ALP SERPL-CCNC: 53 U/L — SIGNIFICANT CHANGE UP (ref 40–120)
ALT FLD-CCNC: 19 U/L — SIGNIFICANT CHANGE UP (ref 10–45)
ALT FLD-CCNC: 20 U/L — SIGNIFICANT CHANGE UP (ref 10–45)
ALT FLD-CCNC: 22 U/L — SIGNIFICANT CHANGE UP (ref 10–45)
ALT FLD-CCNC: 24 U/L — SIGNIFICANT CHANGE UP (ref 10–45)
ANION GAP SERPL CALC-SCNC: 10 MMOL/L — SIGNIFICANT CHANGE UP (ref 5–17)
ANION GAP SERPL CALC-SCNC: 10 MMOL/L — SIGNIFICANT CHANGE UP (ref 5–17)
ANION GAP SERPL CALC-SCNC: 11 MMOL/L — SIGNIFICANT CHANGE UP (ref 5–17)
ANION GAP SERPL CALC-SCNC: 12 MMOL/L — SIGNIFICANT CHANGE UP (ref 5–17)
APTT BLD: 173.2 SEC — CRITICAL HIGH (ref 27.5–36.3)
APTT BLD: 27.6 SEC — SIGNIFICANT CHANGE UP (ref 27.5–36.3)
APTT BLD: 29.8 SEC — SIGNIFICANT CHANGE UP (ref 27.5–36.3)
APTT BLD: 34.3 SEC — SIGNIFICANT CHANGE UP (ref 27.5–36.3)
AST SERPL-CCNC: 73 U/L — HIGH (ref 10–40)
AST SERPL-CCNC: 81 U/L — HIGH (ref 10–40)
AST SERPL-CCNC: 83 U/L — HIGH (ref 10–40)
AST SERPL-CCNC: 88 U/L — HIGH (ref 10–40)
BASOPHILS # BLD AUTO: 0.02 K/UL — SIGNIFICANT CHANGE UP (ref 0–0.2)
BASOPHILS # BLD AUTO: 0.04 K/UL — SIGNIFICANT CHANGE UP (ref 0–0.2)
BASOPHILS NFR BLD AUTO: 0.2 % — SIGNIFICANT CHANGE UP (ref 0–2)
BASOPHILS NFR BLD AUTO: 0.4 % — SIGNIFICANT CHANGE UP (ref 0–2)
BILIRUB DIRECT SERPL-MCNC: 0.2 MG/DL — SIGNIFICANT CHANGE UP (ref 0–0.2)
BILIRUB INDIRECT FLD-MCNC: 0.8 MG/DL — SIGNIFICANT CHANGE UP (ref 0.2–1)
BILIRUB SERPL-MCNC: 0.5 MG/DL — SIGNIFICANT CHANGE UP (ref 0.2–1.2)
BILIRUB SERPL-MCNC: 0.8 MG/DL — SIGNIFICANT CHANGE UP (ref 0.2–1.2)
BILIRUB SERPL-MCNC: 0.9 MG/DL — SIGNIFICANT CHANGE UP (ref 0.2–1.2)
BILIRUB SERPL-MCNC: 1 MG/DL — SIGNIFICANT CHANGE UP (ref 0.2–1.2)
BUN SERPL-MCNC: 11 MG/DL — SIGNIFICANT CHANGE UP (ref 7–23)
BUN SERPL-MCNC: 11 MG/DL — SIGNIFICANT CHANGE UP (ref 7–23)
BUN SERPL-MCNC: 12 MG/DL — SIGNIFICANT CHANGE UP (ref 7–23)
BUN SERPL-MCNC: 12 MG/DL — SIGNIFICANT CHANGE UP (ref 7–23)
CALCIUM SERPL-MCNC: 7.7 MG/DL — LOW (ref 8.4–10.5)
CALCIUM SERPL-MCNC: 7.9 MG/DL — LOW (ref 8.4–10.5)
CALCIUM SERPL-MCNC: 7.9 MG/DL — LOW (ref 8.4–10.5)
CALCIUM SERPL-MCNC: 8.2 MG/DL — LOW (ref 8.4–10.5)
CHLORIDE SERPL-SCNC: 104 MMOL/L — SIGNIFICANT CHANGE UP (ref 96–108)
CHLORIDE SERPL-SCNC: 104 MMOL/L — SIGNIFICANT CHANGE UP (ref 96–108)
CHLORIDE SERPL-SCNC: 106 MMOL/L — SIGNIFICANT CHANGE UP (ref 96–108)
CHLORIDE SERPL-SCNC: 108 MMOL/L — SIGNIFICANT CHANGE UP (ref 96–108)
CO2 SERPL-SCNC: 24 MMOL/L — SIGNIFICANT CHANGE UP (ref 22–31)
CO2 SERPL-SCNC: 25 MMOL/L — SIGNIFICANT CHANGE UP (ref 22–31)
CO2 SERPL-SCNC: 25 MMOL/L — SIGNIFICANT CHANGE UP (ref 22–31)
CO2 SERPL-SCNC: 27 MMOL/L — SIGNIFICANT CHANGE UP (ref 22–31)
CREAT SERPL-MCNC: 0.46 MG/DL — LOW (ref 0.5–1.3)
CREAT SERPL-MCNC: 0.46 MG/DL — LOW (ref 0.5–1.3)
CREAT SERPL-MCNC: 0.49 MG/DL — LOW (ref 0.5–1.3)
CREAT SERPL-MCNC: 0.52 MG/DL — SIGNIFICANT CHANGE UP (ref 0.5–1.3)
EOSINOPHIL # BLD AUTO: 0.05 K/UL — SIGNIFICANT CHANGE UP (ref 0–0.5)
EOSINOPHIL # BLD AUTO: 0.09 K/UL — SIGNIFICANT CHANGE UP (ref 0–0.5)
EOSINOPHIL NFR BLD AUTO: 0.2 % — SIGNIFICANT CHANGE UP (ref 0–6)
EOSINOPHIL NFR BLD AUTO: 1.6 % — SIGNIFICANT CHANGE UP (ref 0–6)
GAS PNL BLDA: SIGNIFICANT CHANGE UP
GLUCOSE BLDC GLUCOMTR-MCNC: 101 MG/DL — HIGH (ref 70–99)
GLUCOSE BLDC GLUCOMTR-MCNC: 115 MG/DL — HIGH (ref 70–99)
GLUCOSE BLDC GLUCOMTR-MCNC: 119 MG/DL — HIGH (ref 70–99)
GLUCOSE BLDC GLUCOMTR-MCNC: 137 MG/DL — HIGH (ref 70–99)
GLUCOSE BLDC GLUCOMTR-MCNC: 144 MG/DL — HIGH (ref 70–99)
GLUCOSE BLDC GLUCOMTR-MCNC: 149 MG/DL — HIGH (ref 70–99)
GLUCOSE BLDC GLUCOMTR-MCNC: 91 MG/DL — SIGNIFICANT CHANGE UP (ref 70–99)
GLUCOSE BLDC GLUCOMTR-MCNC: 96 MG/DL — SIGNIFICANT CHANGE UP (ref 70–99)
GLUCOSE BLDC GLUCOMTR-MCNC: 96 MG/DL — SIGNIFICANT CHANGE UP (ref 70–99)
GLUCOSE SERPL-MCNC: 119 MG/DL — HIGH (ref 70–99)
GLUCOSE SERPL-MCNC: 127 MG/DL — HIGH (ref 70–99)
GLUCOSE SERPL-MCNC: 147 MG/DL — HIGH (ref 70–99)
GLUCOSE SERPL-MCNC: 148 MG/DL — HIGH (ref 70–99)
HCT VFR BLD CALC: 25.8 % — LOW (ref 34.5–45)
HCT VFR BLD CALC: 26.2 % — LOW (ref 34.5–45)
HCT VFR BLD CALC: 29 % — LOW (ref 34.5–45)
HCT VFR BLD CALC: 30.2 % — LOW (ref 34.5–45)
HCT VFR BLD CALC: 38.8 % — SIGNIFICANT CHANGE UP (ref 34.5–45)
HGB BLD-MCNC: 12.5 G/DL — SIGNIFICANT CHANGE UP (ref 11.5–15.5)
HGB BLD-MCNC: 8.3 G/DL — LOW (ref 11.5–15.5)
HGB BLD-MCNC: 8.5 G/DL — LOW (ref 11.5–15.5)
HGB BLD-MCNC: 9.4 G/DL — LOW (ref 11.5–15.5)
HGB BLD-MCNC: 9.7 G/DL — LOW (ref 11.5–15.5)
IMM GRANULOCYTES NFR BLD AUTO: 0.2 % — SIGNIFICANT CHANGE UP (ref 0–1.5)
IMM GRANULOCYTES NFR BLD AUTO: 0.8 % — SIGNIFICANT CHANGE UP (ref 0–1.5)
INR BLD: 1.26 — HIGH (ref 0.88–1.16)
INR BLD: 1.28 — HIGH (ref 0.88–1.16)
INR BLD: 1.38 — HIGH (ref 0.88–1.16)
INR BLD: 1.38 — HIGH (ref 0.88–1.16)
LACTATE SERPL-SCNC: 0.9 MMOL/L — SIGNIFICANT CHANGE UP (ref 0.5–2)
LACTATE SERPL-SCNC: 1.2 MMOL/L — SIGNIFICANT CHANGE UP (ref 0.5–2)
LACTATE SERPL-SCNC: 1.4 MMOL/L — SIGNIFICANT CHANGE UP (ref 0.5–2)
LYMPHOCYTES # BLD AUTO: 1.74 K/UL — SIGNIFICANT CHANGE UP (ref 1–3.3)
LYMPHOCYTES # BLD AUTO: 1.92 K/UL — SIGNIFICANT CHANGE UP (ref 1–3.3)
LYMPHOCYTES # BLD AUTO: 34.7 % — SIGNIFICANT CHANGE UP (ref 13–44)
LYMPHOCYTES # BLD AUTO: 8.1 % — LOW (ref 13–44)
MAGNESIUM SERPL-MCNC: 2.4 MG/DL — SIGNIFICANT CHANGE UP (ref 1.6–2.6)
MAGNESIUM SERPL-MCNC: 2.6 MG/DL — SIGNIFICANT CHANGE UP (ref 1.6–2.6)
MAGNESIUM SERPL-MCNC: 2.8 MG/DL — HIGH (ref 1.6–2.6)
MAGNESIUM SERPL-MCNC: 3.4 MG/DL — HIGH (ref 1.6–2.6)
MCHC RBC-ENTMCNC: 32.1 GM/DL — SIGNIFICANT CHANGE UP (ref 32–36)
MCHC RBC-ENTMCNC: 32.2 GM/DL — SIGNIFICANT CHANGE UP (ref 32–36)
MCHC RBC-ENTMCNC: 32.2 GM/DL — SIGNIFICANT CHANGE UP (ref 32–36)
MCHC RBC-ENTMCNC: 32.4 GM/DL — SIGNIFICANT CHANGE UP (ref 32–36)
MCHC RBC-ENTMCNC: 32.4 GM/DL — SIGNIFICANT CHANGE UP (ref 32–36)
MCHC RBC-ENTMCNC: 32.5 PG — SIGNIFICANT CHANGE UP (ref 27–34)
MCHC RBC-ENTMCNC: 32.9 PG — SIGNIFICANT CHANGE UP (ref 27–34)
MCHC RBC-ENTMCNC: 33 PG — SIGNIFICANT CHANGE UP (ref 27–34)
MCV RBC AUTO: 100.3 FL — HIGH (ref 80–100)
MCV RBC AUTO: 101.6 FL — HIGH (ref 80–100)
MCV RBC AUTO: 102.4 FL — HIGH (ref 80–100)
MONOCYTES # BLD AUTO: 0.5 K/UL — SIGNIFICANT CHANGE UP (ref 0–0.9)
MONOCYTES # BLD AUTO: 1.34 K/UL — HIGH (ref 0–0.9)
MONOCYTES NFR BLD AUTO: 6.3 % — SIGNIFICANT CHANGE UP (ref 2–14)
MONOCYTES NFR BLD AUTO: 9 % — SIGNIFICANT CHANGE UP (ref 2–14)
NEUTROPHILS # BLD AUTO: 18.03 K/UL — HIGH (ref 1.8–7.4)
NEUTROPHILS # BLD AUTO: 3 K/UL — SIGNIFICANT CHANGE UP (ref 1.8–7.4)
NEUTROPHILS NFR BLD AUTO: 54.1 % — SIGNIFICANT CHANGE UP (ref 43–77)
NEUTROPHILS NFR BLD AUTO: 84.4 % — HIGH (ref 43–77)
NRBC # BLD: 0 /100 WBCS — SIGNIFICANT CHANGE UP (ref 0–0)
PHOSPHATE SERPL-MCNC: 2 MG/DL — LOW (ref 2.5–4.5)
PHOSPHATE SERPL-MCNC: 2.2 MG/DL — LOW (ref 2.5–4.5)
PHOSPHATE SERPL-MCNC: 2.7 MG/DL — SIGNIFICANT CHANGE UP (ref 2.5–4.5)
PLATELET # BLD AUTO: 125 K/UL — LOW (ref 150–400)
PLATELET # BLD AUTO: 127 K/UL — LOW (ref 150–400)
PLATELET # BLD AUTO: 139 K/UL — LOW (ref 150–400)
PLATELET # BLD AUTO: 145 K/UL — LOW (ref 150–400)
PLATELET # BLD AUTO: 236 K/UL — SIGNIFICANT CHANGE UP (ref 150–400)
POTASSIUM SERPL-MCNC: 3.9 MMOL/L — SIGNIFICANT CHANGE UP (ref 3.5–5.3)
POTASSIUM SERPL-MCNC: 4.1 MMOL/L — SIGNIFICANT CHANGE UP (ref 3.5–5.3)
POTASSIUM SERPL-MCNC: 4.5 MMOL/L — SIGNIFICANT CHANGE UP (ref 3.5–5.3)
POTASSIUM SERPL-MCNC: 4.8 MMOL/L — SIGNIFICANT CHANGE UP (ref 3.5–5.3)
POTASSIUM SERPL-SCNC: 3.9 MMOL/L — SIGNIFICANT CHANGE UP (ref 3.5–5.3)
POTASSIUM SERPL-SCNC: 4.1 MMOL/L — SIGNIFICANT CHANGE UP (ref 3.5–5.3)
POTASSIUM SERPL-SCNC: 4.5 MMOL/L — SIGNIFICANT CHANGE UP (ref 3.5–5.3)
POTASSIUM SERPL-SCNC: 4.8 MMOL/L — SIGNIFICANT CHANGE UP (ref 3.5–5.3)
PROT SERPL-MCNC: 4.7 G/DL — LOW (ref 6–8.3)
PROT SERPL-MCNC: 5.3 G/DL — LOW (ref 6–8.3)
PROT SERPL-MCNC: 5.5 G/DL — LOW (ref 6–8.3)
PROT SERPL-MCNC: 5.6 G/DL — LOW (ref 6–8.3)
PROTHROM AB SERPL-ACNC: 14.3 SEC — HIGH (ref 10–12.9)
PROTHROM AB SERPL-ACNC: 14.6 SEC — HIGH (ref 10–12.9)
PROTHROM AB SERPL-ACNC: 15.7 SEC — HIGH (ref 10–12.9)
PROTHROM AB SERPL-ACNC: 15.8 SEC — HIGH (ref 10–12.9)
RBC # BLD: 2.52 M/UL — LOW (ref 3.8–5.2)
RBC # BLD: 2.58 M/UL — LOW (ref 3.8–5.2)
RBC # BLD: 2.89 M/UL — LOW (ref 3.8–5.2)
RBC # BLD: 2.95 M/UL — LOW (ref 3.8–5.2)
RBC # BLD: 3.79 M/UL — LOW (ref 3.8–5.2)
RBC # FLD: 13.2 % — SIGNIFICANT CHANGE UP (ref 10.3–14.5)
RBC # FLD: 13.4 % — SIGNIFICANT CHANGE UP (ref 10.3–14.5)
RBC # FLD: 13.5 % — SIGNIFICANT CHANGE UP (ref 10.3–14.5)
SODIUM SERPL-SCNC: 141 MMOL/L — SIGNIFICANT CHANGE UP (ref 135–145)
SODIUM SERPL-SCNC: 141 MMOL/L — SIGNIFICANT CHANGE UP (ref 135–145)
SODIUM SERPL-SCNC: 142 MMOL/L — SIGNIFICANT CHANGE UP (ref 135–145)
SODIUM SERPL-SCNC: 142 MMOL/L — SIGNIFICANT CHANGE UP (ref 135–145)
WBC # BLD: 11.91 K/UL — HIGH (ref 3.8–10.5)
WBC # BLD: 15.44 K/UL — HIGH (ref 3.8–10.5)
WBC # BLD: 16.76 K/UL — HIGH (ref 3.8–10.5)
WBC # BLD: 21.37 K/UL — HIGH (ref 3.8–10.5)
WBC # BLD: 5.54 K/UL — SIGNIFICANT CHANGE UP (ref 3.8–10.5)
WBC # FLD AUTO: 11.91 K/UL — HIGH (ref 3.8–10.5)
WBC # FLD AUTO: 15.44 K/UL — HIGH (ref 3.8–10.5)
WBC # FLD AUTO: 16.76 K/UL — HIGH (ref 3.8–10.5)
WBC # FLD AUTO: 21.37 K/UL — HIGH (ref 3.8–10.5)
WBC # FLD AUTO: 5.54 K/UL — SIGNIFICANT CHANGE UP (ref 3.8–10.5)

## 2019-05-22 PROCEDURE — 33259 ABLATE ATRIA W/BYPASS ADD-ON: CPT | Mod: AS

## 2019-05-22 PROCEDURE — 71045 X-RAY EXAM CHEST 1 VIEW: CPT | Mod: 26

## 2019-05-22 PROCEDURE — 94010 BREATHING CAPACITY TEST: CPT | Mod: 26

## 2019-05-22 PROCEDURE — 99291 CRITICAL CARE FIRST HOUR: CPT

## 2019-05-22 PROCEDURE — 33259 ABLATE ATRIA W/BYPASS ADD-ON: CPT

## 2019-05-22 PROCEDURE — 33426 REPAIR OF MITRAL VALVE: CPT

## 2019-05-22 PROCEDURE — 33426 REPAIR OF MITRAL VALVE: CPT | Mod: AS

## 2019-05-22 PROCEDURE — 93010 ELECTROCARDIOGRAM REPORT: CPT

## 2019-05-22 PROCEDURE — 99292 CRITICAL CARE ADDL 30 MIN: CPT

## 2019-05-22 RX ORDER — ALBUMIN HUMAN 25 %
250 VIAL (ML) INTRAVENOUS ONCE
Refills: 0 | Status: COMPLETED | OUTPATIENT
Start: 2019-05-22 | End: 2019-05-22

## 2019-05-22 RX ORDER — CALCIUM GLUCONATE 100 MG/ML
2 VIAL (ML) INTRAVENOUS ONCE
Refills: 0 | Status: COMPLETED | OUTPATIENT
Start: 2019-05-22 | End: 2019-05-22

## 2019-05-22 RX ORDER — CEFAZOLIN SODIUM 1 G
2000 VIAL (EA) INJECTION EVERY 8 HOURS
Refills: 0 | Status: DISCONTINUED | OUTPATIENT
Start: 2019-05-22 | End: 2019-05-22

## 2019-05-22 RX ORDER — FAMOTIDINE 10 MG/ML
20 INJECTION INTRAVENOUS EVERY 12 HOURS
Refills: 0 | Status: DISCONTINUED | OUTPATIENT
Start: 2019-05-22 | End: 2019-05-23

## 2019-05-22 RX ORDER — CHLORHEXIDINE GLUCONATE 213 G/1000ML
1 SOLUTION TOPICAL
Refills: 0 | Status: DISCONTINUED | OUTPATIENT
Start: 2019-05-22 | End: 2019-05-29

## 2019-05-22 RX ORDER — CEFAZOLIN SODIUM 1 G
2000 VIAL (EA) INJECTION EVERY 8 HOURS
Refills: 0 | Status: COMPLETED | OUTPATIENT
Start: 2019-05-22 | End: 2019-05-23

## 2019-05-22 RX ORDER — ACETAMINOPHEN 500 MG
1000 TABLET ORAL ONCE
Refills: 0 | Status: COMPLETED | OUTPATIENT
Start: 2019-05-22 | End: 2019-05-22

## 2019-05-22 RX ORDER — POTASSIUM CHLORIDE 20 MEQ
20 PACKET (EA) ORAL ONCE
Refills: 0 | Status: COMPLETED | OUTPATIENT
Start: 2019-05-22 | End: 2019-05-22

## 2019-05-22 RX ORDER — DEXTROSE 50 % IN WATER 50 %
25 SYRINGE (ML) INTRAVENOUS
Refills: 0 | Status: DISCONTINUED | OUTPATIENT
Start: 2019-05-22 | End: 2019-05-27

## 2019-05-22 RX ORDER — HEPARIN SODIUM 5000 [USP'U]/ML
5000 INJECTION INTRAVENOUS; SUBCUTANEOUS EVERY 8 HOURS
Refills: 0 | Status: DISCONTINUED | OUTPATIENT
Start: 2019-05-22 | End: 2019-05-27

## 2019-05-22 RX ORDER — DEXTROSE 50 % IN WATER 50 %
50 SYRINGE (ML) INTRAVENOUS
Refills: 0 | Status: DISCONTINUED | OUTPATIENT
Start: 2019-05-22 | End: 2019-05-27

## 2019-05-22 RX ORDER — VASOPRESSIN 20 [USP'U]/ML
0.03 INJECTION INTRAVENOUS
Qty: 50 | Refills: 0 | Status: DISCONTINUED | OUTPATIENT
Start: 2019-05-22 | End: 2019-05-23

## 2019-05-22 RX ORDER — BUPIVACAINE 13.3 MG/ML
20 INJECTION, SUSPENSION, LIPOSOMAL INFILTRATION ONCE
Refills: 0 | Status: DISCONTINUED | OUTPATIENT
Start: 2019-05-22 | End: 2019-05-23

## 2019-05-22 RX ORDER — SODIUM CHLORIDE 9 MG/ML
1000 INJECTION INTRAMUSCULAR; INTRAVENOUS; SUBCUTANEOUS
Refills: 0 | Status: DISCONTINUED | OUTPATIENT
Start: 2019-05-22 | End: 2019-05-27

## 2019-05-22 RX ORDER — ASPIRIN/CALCIUM CARB/MAGNESIUM 324 MG
81 TABLET ORAL DAILY
Refills: 0 | Status: DISCONTINUED | OUTPATIENT
Start: 2019-05-22 | End: 2019-05-29

## 2019-05-22 RX ORDER — INSULIN HUMAN 100 [IU]/ML
1 INJECTION, SOLUTION SUBCUTANEOUS
Qty: 100 | Refills: 0 | Status: DISCONTINUED | OUTPATIENT
Start: 2019-05-22 | End: 2019-05-23

## 2019-05-22 RX ADMIN — INSULIN HUMAN 1 UNIT(S)/HR: 100 INJECTION, SOLUTION SUBCUTANEOUS at 16:19

## 2019-05-22 RX ADMIN — Medication 200 GRAM(S): at 13:36

## 2019-05-22 RX ADMIN — CHLORHEXIDINE GLUCONATE 5 MILLILITER(S): 213 SOLUTION TOPICAL at 06:31

## 2019-05-22 RX ADMIN — Medication 125 MILLILITER(S): at 13:39

## 2019-05-22 RX ADMIN — Medication 125 MILLILITER(S): at 13:40

## 2019-05-22 RX ADMIN — FAMOTIDINE 20 MILLIGRAM(S): 10 INJECTION INTRAVENOUS at 17:59

## 2019-05-22 RX ADMIN — Medication 400 MILLIGRAM(S): at 13:50

## 2019-05-22 RX ADMIN — Medication 1 DROP(S): at 23:21

## 2019-05-22 RX ADMIN — SODIUM CHLORIDE 3 MILLILITER(S): 9 INJECTION INTRAMUSCULAR; INTRAVENOUS; SUBCUTANEOUS at 05:11

## 2019-05-22 RX ADMIN — Medication 2000 MILLIGRAM(S): at 23:19

## 2019-05-22 RX ADMIN — Medication 125 MILLILITER(S): at 18:00

## 2019-05-22 RX ADMIN — SODIUM CHLORIDE 10 MILLILITER(S): 9 INJECTION INTRAMUSCULAR; INTRAVENOUS; SUBCUTANEOUS at 12:30

## 2019-05-22 RX ADMIN — HEPARIN SODIUM 5000 UNIT(S): 5000 INJECTION INTRAVENOUS; SUBCUTANEOUS at 23:00

## 2019-05-22 RX ADMIN — Medication 200 GRAM(S): at 23:00

## 2019-05-22 RX ADMIN — Medication 2000 MILLIGRAM(S): at 16:19

## 2019-05-22 RX ADMIN — Medication 1000 MILLIGRAM(S): at 16:10

## 2019-05-22 RX ADMIN — Medication 81 MILLIGRAM(S): at 23:18

## 2019-05-22 RX ADMIN — Medication 125 MILLILITER(S): at 13:06

## 2019-05-22 RX ADMIN — CHLORHEXIDINE GLUCONATE 1 APPLICATION(S): 213 SOLUTION TOPICAL at 05:32

## 2019-05-22 RX ADMIN — Medication 100 MILLIEQUIVALENT(S): at 17:59

## 2019-05-22 RX ADMIN — Medication 125 MILLILITER(S): at 13:05

## 2019-05-22 NOTE — PROGRESS NOTE ADULT - SUBJECTIVE AND OBJECTIVE BOX
INTERVAL HPI/OVERNIGHT EVENTS:    Op Day:  Minimally invasive MV repair/CryoMAZE  EF normal    77yo Female with Hx HTN, Fib - ablation '18, epilepsy, known mitral valve regurgitation/prolapse with sxs progressive SOB/MO.    Stress test (+) Jan 2018  Cath 2/12/18: normal coronary arteries.   ECHO 4/24: EF normal; severe MR with prolapse of the posterior mitral leaflet. severe eccentric MR, and mild TR, normal LV function and severe LA enlargement. the degree of MR had significantly worsened from prior ECHO    CT Chest 5/21 - Mild fusiform aneurysmal dilatation of the ascending thoracic aorta. Central and peripheral pulmonary arterial hypertension. No pulmonary parenchymal pathology.    to OR today - no blood products and arrived to ICU on no infusions    PMHx includes but is not limited to:   Epilepsy  Macular degeneration  Arrhythmia  HTN (hypertension)  Former smoker    ICU Vital Signs Last 24 Hrs  T(C): 36.1 (22 May 2019 05:15), Max: 36.9 (21 May 2019 14:15)  T(F): 97 (22 May 2019 05:15), Max: 98.5 (21 May 2019 14:15)  HR: 66 (22 May 2019 12:30) (58 - 74) sinus  BP: 126/61 (22 May 2019 05:15) (111/55 - 144/67)  BP(mean): 87 (22 May 2019 05:15) (75 - 97)  ABP: 94/48 (22 May 2019 12:30) (94/48 - 96/48)  ABP(mean): 64 (22 May 2019 12:30) (64 - 66)  SpO2: 100% (22 May 2019 12:30) (96% - 100%) 50% Fi02     Qtts: None    I&O's Summary    21 May 2019 07:01  -  22 May 2019 07:00  --------------------------------------------------------  IN: 0 mL / OUT: 300 mL / NET: -300 mL    22 May 2019 07:01  -  22 May 2019 12:39  --------------------------------------------------------  IN: 0 mL / OUT: 20 mL / NET: -20 mL    Vent settings: AC 14/450/50/5    Physical Exam    Heart - regular (-)rub/gallop  Lungs - CTA anterior - BS appreciated bilaterally - no wheeze  Abd - (+) BS soft NTND   Ext - warm to touch; 1+ peripheral palpable pulses appreciated . no cyanosis/clubbing  Chest - incision site clean and dry -   Neuro - pupils reactive to light ; otherwise unable at this time  Skin - no rash    LABS:                        12.5   5.54  )-----------( 236      ( 22 May 2019 05:53 )             38.8     05-21    142  |  105  |  17  ----------------------------<  97  4.2   |  28  |  0.58    Ca    9.0      21 May 2019 13:46    TPro  7.1  /  Alb  4.3  /  TBili  0.4  /  DBili  x   /  AST  21  /  ALT  19  /  AlkPhos  79  05-21    PT/INR - ( 21 May 2019 13:46 )   PT: 11.2 sec;   INR: 0.99     PTT - ( 21 May 2019 13:46 )  PTT:31.8 sec    RADIOLOGY & ADDITIONAL STUDIES: reviewed    patient with known mitral valve disease and SVT - symptomatic now Op Day - MV repair and CryoMAZE procedure    1. CV  sinus rhythm - pacer backup - per anesthesia - required transient pacing intraop  labile BP at presentation - albumin x 2 given  LA 1.4  currently in sinus rhythm   ASA  complete periop Abx prophylaxis    2. Pulm   anticipate rapid wean to extubate in short post-op course  serial ABG - to optimize oxygenation and ventilation  monitor chest tube output    maintain glycemic control  DVT and GI prophylaxis    d/w anesthesia/staff/ at bedside and CTS    I have spent/provided stated minutes of critical care time to this patient: 60 min

## 2019-05-22 NOTE — PROGRESS NOTE ADULT - SUBJECTIVE AND OBJECTIVE BOX
CTICU  CRITICAL  CARE  attending     Hand off received 					   Pertinent clinical, laboratory, radiographic, hemodynamic, echocardiographic, respiratory data, microbiologic data and chart were reviewed and analyzed frequently throughout the course of the day and night  Patient seen and examined with CTS/ SH attending at bedside    77 y/o female with PMHx of HTN, CHF, CMP frequent PVC's s/p ablation at [facility] in 2018 for >17,000 PVC on the Holter monitor   She now presents for her known mitral valve regurgitation/prolapse. Patient reports worsening MO and decreased  exercise tolerance of 2 blocks. She had a + stress test in Jan 2018.  LHC on 2/12/18 showing normal coronary arteries.   Her recent  echo on 4/24/19  showed severe MR with prolapse of the  posterior mitral leaflet and  severe eccentric MR, and mild TR, normal LV function and severe LA enlargement.  The degree of MR had significantly worsened from echo's done in 2018 and 2017. She was seen by Dr. Kent in the office and was found to be a surgical candidate. She now presents for her completion of her preoperative workup including carotid duplex,  X ray, non contrast chest CT.  She states that she has been in her usual state of health. She states that she has some fatigue and MO after walking a few blocks. She denies any CP, palpitations, orthopnea, LE edema, fevers/chills, nausea/vomiting.       FAMILY HISTORY:  No pertinent family history in first degree relatives  PAST MEDICAL & SURGICAL HISTORY:  Epilepsy  Macular degeneration  Arrhythmia  HTN (hypertension)  Former smoker  No significant past surgical history    Patient is a 78y old  Female who presents with severe Mitral Regurgitation    14 system review was unremarkable  acute changes include acute respiratory failure  Vital signs, hemodynamic and respiratory parameters were reviewed from the bedside nursing flow sheet.  ICU Vital Signs Last 24 Hrs  T(C): 37 (22 May 2019 21:07), Max: 37 (22 May 2019 21:07)  T(F): 98.6 (22 May 2019 21:07), Max: 98.6 (22 May 2019 21:07)  HR: 66 (22 May 2019 22:00) (59 - 74)  BP: 114/47 (22 May 2019 16:30) (114/47 - 127/60)  BP(mean): 78 (22 May 2019 16:30) (78 - 87)  ABP: 132/50 (22 May 2019 22:00) (92/44 - 132/52)  ABP(mean): 78 (22 May 2019 22:00) (58 - 80)  RR: 18 (22 May 2019 22:00) (8 - 25)  SpO2: 99% (22 May 2019 22:00) (94% - 100%)    Adult Advanced Hemodynamics Last 24 Hrs  CVP(mm Hg): 8 (22 May 2019 22:00) (-1 - 12)  CVP(cm H2O): --  CO: --  CI: --  PA: --  PA(mean): --  PCWP: --  SVR: --  SVRI: --  PVR: --  PVRI: --, ABG - ( 22 May 2019 22:09 )  pH, Arterial: 7.44  pH, Blood: x     /  pCO2: 39    /  pO2: 131   / HCO3: 25    / Base Excess: 1.2   /  SaO2: 99                Mode: standby    Intake and output was reviewed and the fluid balance was calculated  Daily Height in cm: 157.48 (22 May 2019 05:15)    Daily   I&O's Summary    21 May 2019 07:01  -  22 May 2019 07:00  --------------------------------------------------------  IN: 0 mL / OUT: 300 mL / NET: -300 mL    22 May 2019 07:01  -  22 May 2019 23:36  --------------------------------------------------------  IN: 2997 mL / OUT: 1960 mL / NET: 1037 mL        All lines and drain sites were assessed  Glycemic trend was reviewed  CAPILLARY BLOOD GLUCOSE      POCT Blood Glucose.: 115 mg/dL (22 May 2019 23:03)    NEURO: No acute change in mental status from the baseline.  CVS: S1, S2, No S3.  RESPIRATORY: Auscultation of the chest reveals equal breath sounds.  GI: Abdomen is soft. No tenderness. + Bowel sounds.  Extremities are warm and well perfused.  VASCULAR: + DP/PT.  Wounds appear clean and unremarkable  Antibiotics are perioperative cefazolin.    labs  CBC Full  -  ( 22 May 2019 22:11 )  WBC Count : 11.91 K/uL  RBC Count : 2.89 M/uL  Hemoglobin : 9.4 g/dL  Hematocrit : 29.0 %  Platelet Count - Automated : 127 K/uL  Mean Cell Volume : 100.3 fl  Mean Cell Hemoglobin : 32.5 pg  Mean Cell Hemoglobin Concentration : 32.4 gm/dL  Auto Neutrophil # : x  Auto Lymphocyte # : x  Auto Monocyte # : x  Auto Eosinophil # : x  Auto Basophil # : x  Auto Neutrophil % : x  Auto Lymphocyte % : x  Auto Monocyte % : x  Auto Eosinophil % : x  Auto Basophil % : x    05-22    142  |  106  |  12  ----------------------------<  127<H>  4.8   |  25  |  0.52    Ca    8.2<L>      22 May 2019 22:11  Phos  2.7     05-22  Mg     2.4     05-22    TPro  5.5<L>  /  Alb  3.8  /  TBili  0.9  /  DBili  x   /  AST  73<H>  /  ALT  19  /  AlkPhos  48  05-22    PT/INR - ( 22 May 2019 23:13 )   PT: 14.6 sec;   INR: 1.28          PTT - ( 22 May 2019 23:13 )  PTT:34.3 sec  The current medications were reviewed   MEDICATIONS  (STANDING):  aspirin enteric coated 81 milliGRAM(s) Oral daily  BUpivacaine liposome 1.3% Injectable (no eMAR) 20 milliLiter(s) Local Injection once  calcium gluconate IVPB 2 Gram(s) IV Intermittent once  ceFAZolin  Injectable. 2000 milliGRAM(s) IV Push every 8 hours  chlorhexidine 2% Cloths 1 Application(s) Topical <User Schedule>  dextrose 50% Injectable 50 milliLiter(s) IV Push every 15 minutes  dextrose 50% Injectable 25 milliLiter(s) IV Push every 15 minutes  famotidine Injectable 20 milliGRAM(s) IV Push every 12 hours  heparin  Injectable 5000 Unit(s) SubCutaneous every 8 hours  insulin regular Infusion 1 Unit(s)/Hr (1 mL/Hr) IV Continuous <Continuous>  PreserVision Eye Vitamin tablet 2 Capsule(s) 2 Capsule(s) Oral daily  sodium chloride 0.9%. 1000 milliLiter(s) (10 mL/Hr) IV Continuous <Continuous>  vasopressin Infusion 0.03 Unit(s)/Min (1.8 mL/Hr) IV Continuous <Continuous>    MEDICATIONS  (PRN):  artificial  tears Solution 1 Drop(s) Both EYES every 3 hours PRN Dry Eyes      PROBLEM LIST/ ASSESSMENT:  HEALTH ISSUES - PROBLEM Dx:        Patient is a 78y old  Female who presents with Mitral Regurgitation   S/P Mitral valve repair.  S/P occlusion of LA appendage.  S/P cryo maze.  Hemodynamically stable.  Diuresing well.  Good oxygenation.  Overall doing well.          My plan includes :  Transfuse PRBC.  Beta blocker and statin.  After load reduction.  ASA and eliquis.  Close hemodynamic, ventilatory and drain monitoring and management.  Monitor for arrhythmias and monitor parameters for organ perfusion  Monitor neurologic status  Head of the bed should remain elevated to 45 deg .   Chest PT and IS will be encouraged  Monitor adequacy of oxygenation and ventilation and attempt to wean oxygen  Nutritional goals will be met using po eventually , ensure adequate caloric intake and monitor  the same  Stress ulcer and VTE prophylaxis will be achieved    Glycemic control is satisfactory  Electrolytes have been repleted as necessary and wound care has been carried out. Pain control has been achieved.   Aggressive  physical therapy and early mobility and ambulation goals will be met   The family was updated about the course and plan  CRITICAL CARE TIME SPENT in evaluation and management, reassessments, review and interpretation of labs and x-rays, ventilator and hemodynamic management, formulating a plan and coordinating care: ___30____ MIN.  Time does not include procedural time.  CTICU ATTENDING     					    Lio Walton MD

## 2019-05-22 NOTE — BRIEF OPERATIVE NOTE - NSICDXBRIEFPREOP_GEN_ALL_CORE_FT
PRE-OP DIAGNOSIS:  Mitral valve regurgitation 22-May-2019 06:23:30  May Alan PRE-OP DIAGNOSIS:  Mitral valve regurgitation 22-May-2019 06:23:30  May Alna

## 2019-05-22 NOTE — BRIEF OPERATIVE NOTE - NSICDXBRIEFPROCEDURE_GEN_ALL_CORE_FT
PROCEDURES:  Repair, mitral valve, with MANUEL 22-May-2019 12:21:18 Mitral valve repair, cryomaze, Clipping of left atrial appendage, EF May Molina

## 2019-05-23 LAB
ALBUMIN SERPL ELPH-MCNC: 3.6 G/DL — SIGNIFICANT CHANGE UP (ref 3.3–5)
ALBUMIN SERPL ELPH-MCNC: 3.9 G/DL — SIGNIFICANT CHANGE UP (ref 3.3–5)
ALBUMIN SERPL ELPH-MCNC: 3.9 G/DL — SIGNIFICANT CHANGE UP (ref 3.3–5)
ALBUMIN SERPL ELPH-MCNC: 4.2 G/DL — SIGNIFICANT CHANGE UP (ref 3.3–5)
ALP SERPL-CCNC: 42 U/L — SIGNIFICANT CHANGE UP (ref 40–120)
ALP SERPL-CCNC: 43 U/L — SIGNIFICANT CHANGE UP (ref 40–120)
ALP SERPL-CCNC: 47 U/L — SIGNIFICANT CHANGE UP (ref 40–120)
ALP SERPL-CCNC: 49 U/L — SIGNIFICANT CHANGE UP (ref 40–120)
ALT FLD-CCNC: 12 U/L — SIGNIFICANT CHANGE UP (ref 10–45)
ALT FLD-CCNC: 14 U/L — SIGNIFICANT CHANGE UP (ref 10–45)
ALT FLD-CCNC: 15 U/L — SIGNIFICANT CHANGE UP (ref 10–45)
ALT FLD-CCNC: 15 U/L — SIGNIFICANT CHANGE UP (ref 10–45)
ANION GAP SERPL CALC-SCNC: 10 MMOL/L — SIGNIFICANT CHANGE UP (ref 5–17)
ANION GAP SERPL CALC-SCNC: 12 MMOL/L — SIGNIFICANT CHANGE UP (ref 5–17)
ANION GAP SERPL CALC-SCNC: 13 MMOL/L — SIGNIFICANT CHANGE UP (ref 5–17)
APTT BLD: 30.7 SEC — SIGNIFICANT CHANGE UP (ref 27.5–36.3)
APTT BLD: 32.8 SEC — SIGNIFICANT CHANGE UP (ref 27.5–36.3)
APTT BLD: 34.1 SEC — SIGNIFICANT CHANGE UP (ref 27.5–36.3)
APTT BLD: 34.4 SEC — SIGNIFICANT CHANGE UP (ref 27.5–36.3)
APTT BLD: 34.8 SEC — SIGNIFICANT CHANGE UP (ref 27.5–36.3)
AST SERPL-CCNC: 37 U/L — SIGNIFICANT CHANGE UP (ref 10–40)
AST SERPL-CCNC: 41 U/L — HIGH (ref 10–40)
AST SERPL-CCNC: 48 U/L — HIGH (ref 10–40)
AST SERPL-CCNC: 58 U/L — HIGH (ref 10–40)
BILIRUB SERPL-MCNC: 0.7 MG/DL — SIGNIFICANT CHANGE UP (ref 0.2–1.2)
BILIRUB SERPL-MCNC: 0.7 MG/DL — SIGNIFICANT CHANGE UP (ref 0.2–1.2)
BILIRUB SERPL-MCNC: 0.9 MG/DL — SIGNIFICANT CHANGE UP (ref 0.2–1.2)
BILIRUB SERPL-MCNC: 1 MG/DL — SIGNIFICANT CHANGE UP (ref 0.2–1.2)
BUN SERPL-MCNC: 12 MG/DL — SIGNIFICANT CHANGE UP (ref 7–23)
BUN SERPL-MCNC: 12 MG/DL — SIGNIFICANT CHANGE UP (ref 7–23)
BUN SERPL-MCNC: 13 MG/DL — SIGNIFICANT CHANGE UP (ref 7–23)
BUN SERPL-MCNC: 17 MG/DL — SIGNIFICANT CHANGE UP (ref 7–23)
BUN SERPL-MCNC: 18 MG/DL — SIGNIFICANT CHANGE UP (ref 7–23)
CALCIUM SERPL-MCNC: 8.5 MG/DL — SIGNIFICANT CHANGE UP (ref 8.4–10.5)
CALCIUM SERPL-MCNC: 8.9 MG/DL — SIGNIFICANT CHANGE UP (ref 8.4–10.5)
CALCIUM SERPL-MCNC: 9.1 MG/DL — SIGNIFICANT CHANGE UP (ref 8.4–10.5)
CALCIUM SERPL-MCNC: 9.3 MG/DL — SIGNIFICANT CHANGE UP (ref 8.4–10.5)
CALCIUM SERPL-MCNC: 9.6 MG/DL — SIGNIFICANT CHANGE UP (ref 8.4–10.5)
CHLORIDE SERPL-SCNC: 102 MMOL/L — SIGNIFICANT CHANGE UP (ref 96–108)
CHLORIDE SERPL-SCNC: 106 MMOL/L — SIGNIFICANT CHANGE UP (ref 96–108)
CHLORIDE SERPL-SCNC: 95 MMOL/L — LOW (ref 96–108)
CHLORIDE SERPL-SCNC: 98 MMOL/L — SIGNIFICANT CHANGE UP (ref 96–108)
CHLORIDE SERPL-SCNC: 98 MMOL/L — SIGNIFICANT CHANGE UP (ref 96–108)
CO2 SERPL-SCNC: 22 MMOL/L — SIGNIFICANT CHANGE UP (ref 22–31)
CO2 SERPL-SCNC: 25 MMOL/L — SIGNIFICANT CHANGE UP (ref 22–31)
CO2 SERPL-SCNC: 26 MMOL/L — SIGNIFICANT CHANGE UP (ref 22–31)
CO2 SERPL-SCNC: 26 MMOL/L — SIGNIFICANT CHANGE UP (ref 22–31)
CO2 SERPL-SCNC: 30 MMOL/L — SIGNIFICANT CHANGE UP (ref 22–31)
CREAT SERPL-MCNC: 0.54 MG/DL — SIGNIFICANT CHANGE UP (ref 0.5–1.3)
CREAT SERPL-MCNC: 0.57 MG/DL — SIGNIFICANT CHANGE UP (ref 0.5–1.3)
CREAT SERPL-MCNC: 0.65 MG/DL — SIGNIFICANT CHANGE UP (ref 0.5–1.3)
CREAT SERPL-MCNC: 0.72 MG/DL — SIGNIFICANT CHANGE UP (ref 0.5–1.3)
CREAT SERPL-MCNC: 0.85 MG/DL — SIGNIFICANT CHANGE UP (ref 0.5–1.3)
GAS PNL BLDA: SIGNIFICANT CHANGE UP
GLUCOSE BLDC GLUCOMTR-MCNC: 113 MG/DL — HIGH (ref 70–99)
GLUCOSE BLDC GLUCOMTR-MCNC: 120 MG/DL — HIGH (ref 70–99)
GLUCOSE BLDC GLUCOMTR-MCNC: 128 MG/DL — HIGH (ref 70–99)
GLUCOSE BLDC GLUCOMTR-MCNC: 138 MG/DL — HIGH (ref 70–99)
GLUCOSE BLDC GLUCOMTR-MCNC: 167 MG/DL — HIGH (ref 70–99)
GLUCOSE BLDC GLUCOMTR-MCNC: 171 MG/DL — HIGH (ref 70–99)
GLUCOSE BLDC GLUCOMTR-MCNC: 256 MG/DL — HIGH (ref 70–99)
GLUCOSE BLDC GLUCOMTR-MCNC: 97 MG/DL — SIGNIFICANT CHANGE UP (ref 70–99)
GLUCOSE SERPL-MCNC: 126 MG/DL — HIGH (ref 70–99)
GLUCOSE SERPL-MCNC: 138 MG/DL — HIGH (ref 70–99)
GLUCOSE SERPL-MCNC: 160 MG/DL — HIGH (ref 70–99)
GLUCOSE SERPL-MCNC: 169 MG/DL — HIGH (ref 70–99)
GLUCOSE SERPL-MCNC: 263 MG/DL — HIGH (ref 70–99)
HCT VFR BLD CALC: 25.6 % — LOW (ref 34.5–45)
HCT VFR BLD CALC: 26.1 % — LOW (ref 34.5–45)
HCT VFR BLD CALC: 26.4 % — LOW (ref 34.5–45)
HCT VFR BLD CALC: 28.4 % — LOW (ref 34.5–45)
HCT VFR BLD CALC: 29 % — LOW (ref 34.5–45)
HGB BLD-MCNC: 8.6 G/DL — LOW (ref 11.5–15.5)
HGB BLD-MCNC: 8.6 G/DL — LOW (ref 11.5–15.5)
HGB BLD-MCNC: 8.9 G/DL — LOW (ref 11.5–15.5)
HGB BLD-MCNC: 9.4 G/DL — LOW (ref 11.5–15.5)
HGB BLD-MCNC: 9.8 G/DL — LOW (ref 11.5–15.5)
INR BLD: 1.22 — HIGH (ref 0.88–1.16)
INR BLD: 1.24 — HIGH (ref 0.88–1.16)
INR BLD: 1.33 — HIGH (ref 0.88–1.16)
INR BLD: 1.38 — HIGH (ref 0.88–1.16)
INR BLD: 1.42 — HIGH (ref 0.88–1.16)
LACTATE SERPL-SCNC: 0.8 MMOL/L — SIGNIFICANT CHANGE UP (ref 0.5–2)
LACTATE SERPL-SCNC: 0.9 MMOL/L — SIGNIFICANT CHANGE UP (ref 0.5–2)
LACTATE SERPL-SCNC: 1.3 MMOL/L — SIGNIFICANT CHANGE UP (ref 0.5–2)
LACTATE SERPL-SCNC: 4.7 MMOL/L — CRITICAL HIGH (ref 0.5–2)
MAGNESIUM SERPL-MCNC: 1.8 MG/DL — SIGNIFICANT CHANGE UP (ref 1.6–2.6)
MAGNESIUM SERPL-MCNC: 2.1 MG/DL — SIGNIFICANT CHANGE UP (ref 1.6–2.6)
MCHC RBC-ENTMCNC: 32.6 PG — SIGNIFICANT CHANGE UP (ref 27–34)
MCHC RBC-ENTMCNC: 32.8 PG — SIGNIFICANT CHANGE UP (ref 27–34)
MCHC RBC-ENTMCNC: 32.8 PG — SIGNIFICANT CHANGE UP (ref 27–34)
MCHC RBC-ENTMCNC: 33 GM/DL — SIGNIFICANT CHANGE UP (ref 32–36)
MCHC RBC-ENTMCNC: 33 PG — SIGNIFICANT CHANGE UP (ref 27–34)
MCHC RBC-ENTMCNC: 33.1 GM/DL — SIGNIFICANT CHANGE UP (ref 32–36)
MCHC RBC-ENTMCNC: 33.1 PG — SIGNIFICANT CHANGE UP (ref 27–34)
MCHC RBC-ENTMCNC: 33.6 GM/DL — SIGNIFICANT CHANGE UP (ref 32–36)
MCHC RBC-ENTMCNC: 33.7 GM/DL — SIGNIFICANT CHANGE UP (ref 32–36)
MCHC RBC-ENTMCNC: 33.8 GM/DL — SIGNIFICANT CHANGE UP (ref 32–36)
MCV RBC AUTO: 100 FL — SIGNIFICANT CHANGE UP (ref 80–100)
MCV RBC AUTO: 97.4 FL — SIGNIFICANT CHANGE UP (ref 80–100)
MCV RBC AUTO: 97.7 FL — SIGNIFICANT CHANGE UP (ref 80–100)
MCV RBC AUTO: 98 FL — SIGNIFICANT CHANGE UP (ref 80–100)
MCV RBC AUTO: 98.6 FL — SIGNIFICANT CHANGE UP (ref 80–100)
NRBC # BLD: 0 /100 WBCS — SIGNIFICANT CHANGE UP (ref 0–0)
PHOSPHATE SERPL-MCNC: 2 MG/DL — LOW (ref 2.5–4.5)
PHOSPHATE SERPL-MCNC: 2.1 MG/DL — LOW (ref 2.5–4.5)
PHOSPHATE SERPL-MCNC: 2.7 MG/DL — SIGNIFICANT CHANGE UP (ref 2.5–4.5)
PHOSPHATE SERPL-MCNC: 3 MG/DL — SIGNIFICANT CHANGE UP (ref 2.5–4.5)
PLATELET # BLD AUTO: 111 K/UL — LOW (ref 150–400)
PLATELET # BLD AUTO: 111 K/UL — LOW (ref 150–400)
PLATELET # BLD AUTO: 117 K/UL — LOW (ref 150–400)
PLATELET # BLD AUTO: 123 K/UL — LOW (ref 150–400)
PLATELET # BLD AUTO: 124 K/UL — LOW (ref 150–400)
POTASSIUM SERPL-MCNC: 3.6 MMOL/L — SIGNIFICANT CHANGE UP (ref 3.5–5.3)
POTASSIUM SERPL-MCNC: 4.1 MMOL/L — SIGNIFICANT CHANGE UP (ref 3.5–5.3)
POTASSIUM SERPL-MCNC: 4.1 MMOL/L — SIGNIFICANT CHANGE UP (ref 3.5–5.3)
POTASSIUM SERPL-MCNC: 4.2 MMOL/L — SIGNIFICANT CHANGE UP (ref 3.5–5.3)
POTASSIUM SERPL-MCNC: 4.2 MMOL/L — SIGNIFICANT CHANGE UP (ref 3.5–5.3)
POTASSIUM SERPL-SCNC: 3.6 MMOL/L — SIGNIFICANT CHANGE UP (ref 3.5–5.3)
POTASSIUM SERPL-SCNC: 4.1 MMOL/L — SIGNIFICANT CHANGE UP (ref 3.5–5.3)
POTASSIUM SERPL-SCNC: 4.1 MMOL/L — SIGNIFICANT CHANGE UP (ref 3.5–5.3)
POTASSIUM SERPL-SCNC: 4.2 MMOL/L — SIGNIFICANT CHANGE UP (ref 3.5–5.3)
POTASSIUM SERPL-SCNC: 4.2 MMOL/L — SIGNIFICANT CHANGE UP (ref 3.5–5.3)
PROT SERPL-MCNC: 5.5 G/DL — LOW (ref 6–8.3)
PROT SERPL-MCNC: 5.7 G/DL — LOW (ref 6–8.3)
PROT SERPL-MCNC: 6 G/DL — SIGNIFICANT CHANGE UP (ref 6–8.3)
PROT SERPL-MCNC: 6.1 G/DL — SIGNIFICANT CHANGE UP (ref 6–8.3)
PROTHROM AB SERPL-ACNC: 13.9 SEC — HIGH (ref 10–12.9)
PROTHROM AB SERPL-ACNC: 14.1 SEC — HIGH (ref 10–12.9)
PROTHROM AB SERPL-ACNC: 15.2 SEC — HIGH (ref 10–12.9)
PROTHROM AB SERPL-ACNC: 15.7 SEC — HIGH (ref 10–12.9)
PROTHROM AB SERPL-ACNC: 16.2 SEC — HIGH (ref 10–12.9)
RBC # BLD: 2.61 M/UL — LOW (ref 3.8–5.2)
RBC # BLD: 2.62 M/UL — LOW (ref 3.8–5.2)
RBC # BLD: 2.71 M/UL — LOW (ref 3.8–5.2)
RBC # BLD: 2.88 M/UL — LOW (ref 3.8–5.2)
RBC # BLD: 2.96 M/UL — LOW (ref 3.8–5.2)
RBC # FLD: 13.7 % — SIGNIFICANT CHANGE UP (ref 10.3–14.5)
RBC # FLD: 13.8 % — SIGNIFICANT CHANGE UP (ref 10.3–14.5)
RBC # FLD: 14.3 % — SIGNIFICANT CHANGE UP (ref 10.3–14.5)
RBC # FLD: 14.4 % — SIGNIFICANT CHANGE UP (ref 10.3–14.5)
RBC # FLD: 14.5 % — SIGNIFICANT CHANGE UP (ref 10.3–14.5)
SODIUM SERPL-SCNC: 135 MMOL/L — SIGNIFICANT CHANGE UP (ref 135–145)
SODIUM SERPL-SCNC: 136 MMOL/L — SIGNIFICANT CHANGE UP (ref 135–145)
SODIUM SERPL-SCNC: 137 MMOL/L — SIGNIFICANT CHANGE UP (ref 135–145)
SODIUM SERPL-SCNC: 140 MMOL/L — SIGNIFICANT CHANGE UP (ref 135–145)
SODIUM SERPL-SCNC: 141 MMOL/L — SIGNIFICANT CHANGE UP (ref 135–145)
WBC # BLD: 12.96 K/UL — HIGH (ref 3.8–10.5)
WBC # BLD: 13.53 K/UL — HIGH (ref 3.8–10.5)
WBC # BLD: 13.65 K/UL — HIGH (ref 3.8–10.5)
WBC # BLD: 14.69 K/UL — HIGH (ref 3.8–10.5)
WBC # BLD: 14.79 K/UL — HIGH (ref 3.8–10.5)
WBC # FLD AUTO: 12.96 K/UL — HIGH (ref 3.8–10.5)
WBC # FLD AUTO: 13.53 K/UL — HIGH (ref 3.8–10.5)
WBC # FLD AUTO: 13.65 K/UL — HIGH (ref 3.8–10.5)
WBC # FLD AUTO: 14.69 K/UL — HIGH (ref 3.8–10.5)
WBC # FLD AUTO: 14.79 K/UL — HIGH (ref 3.8–10.5)

## 2019-05-23 PROCEDURE — 71045 X-RAY EXAM CHEST 1 VIEW: CPT | Mod: 26

## 2019-05-23 PROCEDURE — 99291 CRITICAL CARE FIRST HOUR: CPT

## 2019-05-23 RX ORDER — PANTOPRAZOLE SODIUM 20 MG/1
40 TABLET, DELAYED RELEASE ORAL
Refills: 0 | Status: DISCONTINUED | OUTPATIENT
Start: 2019-05-23 | End: 2019-05-29

## 2019-05-23 RX ORDER — MAGNESIUM SULFATE 500 MG/ML
2 VIAL (ML) INJECTION ONCE
Refills: 0 | Status: COMPLETED | OUTPATIENT
Start: 2019-05-23 | End: 2019-05-24

## 2019-05-23 RX ORDER — ATORVASTATIN CALCIUM 80 MG/1
20 TABLET, FILM COATED ORAL AT BEDTIME
Refills: 0 | Status: DISCONTINUED | OUTPATIENT
Start: 2019-05-23 | End: 2019-05-29

## 2019-05-23 RX ORDER — DOCUSATE SODIUM 100 MG
100 CAPSULE ORAL THREE TIMES A DAY
Refills: 0 | Status: DISCONTINUED | OUTPATIENT
Start: 2019-05-23 | End: 2019-05-25

## 2019-05-23 RX ORDER — GLUCAGON INJECTION, SOLUTION 0.5 MG/.1ML
1 INJECTION, SOLUTION SUBCUTANEOUS ONCE
Refills: 0 | Status: DISCONTINUED | OUTPATIENT
Start: 2019-05-23 | End: 2019-05-27

## 2019-05-23 RX ORDER — CALCIUM GLUCONATE 100 MG/ML
2 VIAL (ML) INTRAVENOUS ONCE
Refills: 0 | Status: COMPLETED | OUTPATIENT
Start: 2019-05-23 | End: 2019-05-23

## 2019-05-23 RX ORDER — POTASSIUM PHOSPHATE, MONOBASIC POTASSIUM PHOSPHATE, DIBASIC 236; 224 MG/ML; MG/ML
15 INJECTION, SOLUTION INTRAVENOUS ONCE
Refills: 0 | Status: COMPLETED | OUTPATIENT
Start: 2019-05-23 | End: 2019-05-23

## 2019-05-23 RX ORDER — ALBUMIN HUMAN 25 %
250 VIAL (ML) INTRAVENOUS
Refills: 0 | Status: COMPLETED | OUTPATIENT
Start: 2019-05-23 | End: 2019-05-23

## 2019-05-23 RX ORDER — POTASSIUM CHLORIDE 20 MEQ
20 PACKET (EA) ORAL
Refills: 0 | Status: COMPLETED | OUTPATIENT
Start: 2019-05-23 | End: 2019-05-23

## 2019-05-23 RX ORDER — FUROSEMIDE 40 MG
20 TABLET ORAL ONCE
Refills: 0 | Status: COMPLETED | OUTPATIENT
Start: 2019-05-23 | End: 2019-05-23

## 2019-05-23 RX ORDER — SENNA PLUS 8.6 MG/1
2 TABLET ORAL AT BEDTIME
Refills: 0 | Status: DISCONTINUED | OUTPATIENT
Start: 2019-05-23 | End: 2019-05-25

## 2019-05-23 RX ORDER — SODIUM CHLORIDE 9 MG/ML
1000 INJECTION, SOLUTION INTRAVENOUS
Refills: 0 | Status: DISCONTINUED | OUTPATIENT
Start: 2019-05-23 | End: 2019-05-27

## 2019-05-23 RX ORDER — BUDESONIDE, MICRONIZED 100 %
0.5 POWDER (GRAM) MISCELLANEOUS ONCE
Refills: 0 | Status: COMPLETED | OUTPATIENT
Start: 2019-05-23 | End: 2019-05-23

## 2019-05-23 RX ORDER — INSULIN LISPRO 100/ML
VIAL (ML) SUBCUTANEOUS
Refills: 0 | Status: DISCONTINUED | OUTPATIENT
Start: 2019-05-23 | End: 2019-05-27

## 2019-05-23 RX ORDER — AMIODARONE HYDROCHLORIDE 400 MG/1
150 TABLET ORAL ONCE
Refills: 0 | Status: COMPLETED | OUTPATIENT
Start: 2019-05-23 | End: 2019-05-23

## 2019-05-23 RX ORDER — IPRATROPIUM/ALBUTEROL SULFATE 18-103MCG
3 AEROSOL WITH ADAPTER (GRAM) INHALATION ONCE
Refills: 0 | Status: COMPLETED | OUTPATIENT
Start: 2019-05-23 | End: 2019-05-23

## 2019-05-23 RX ORDER — DEXTROSE 50 % IN WATER 50 %
15 SYRINGE (ML) INTRAVENOUS ONCE
Refills: 0 | Status: DISCONTINUED | OUTPATIENT
Start: 2019-05-23 | End: 2019-05-27

## 2019-05-23 RX ADMIN — Medication 2000 MILLIGRAM(S): at 22:11

## 2019-05-23 RX ADMIN — Medication 0.5 MILLIGRAM(S): at 06:38

## 2019-05-23 RX ADMIN — Medication 125 MILLILITER(S): at 13:32

## 2019-05-23 RX ADMIN — Medication 200 GRAM(S): at 18:43

## 2019-05-23 RX ADMIN — Medication 200 GRAM(S): at 05:26

## 2019-05-23 RX ADMIN — PANTOPRAZOLE SODIUM 40 MILLIGRAM(S): 20 TABLET, DELAYED RELEASE ORAL at 07:16

## 2019-05-23 RX ADMIN — HEPARIN SODIUM 5000 UNIT(S): 5000 INJECTION INTRAVENOUS; SUBCUTANEOUS at 07:34

## 2019-05-23 RX ADMIN — Medication 1: at 07:48

## 2019-05-23 RX ADMIN — Medication 100 MILLIEQUIVALENT(S): at 07:00

## 2019-05-23 RX ADMIN — Medication 3: at 12:16

## 2019-05-23 RX ADMIN — Medication 3 MILLILITER(S): at 06:30

## 2019-05-23 RX ADMIN — Medication 500 MILLILITER(S): at 01:00

## 2019-05-23 RX ADMIN — Medication 1: at 16:16

## 2019-05-23 RX ADMIN — Medication 100 MILLIEQUIVALENT(S): at 08:00

## 2019-05-23 RX ADMIN — Medication 81 MILLIGRAM(S): at 12:16

## 2019-05-23 RX ADMIN — CHLORHEXIDINE GLUCONATE 1 APPLICATION(S): 213 SOLUTION TOPICAL at 06:50

## 2019-05-23 RX ADMIN — ATORVASTATIN CALCIUM 20 MILLIGRAM(S): 80 TABLET, FILM COATED ORAL at 22:11

## 2019-05-23 RX ADMIN — HEPARIN SODIUM 5000 UNIT(S): 5000 INJECTION INTRAVENOUS; SUBCUTANEOUS at 14:23

## 2019-05-23 RX ADMIN — AMIODARONE HYDROCHLORIDE 600 MILLIGRAM(S): 400 TABLET ORAL at 06:38

## 2019-05-23 RX ADMIN — HEPARIN SODIUM 5000 UNIT(S): 5000 INJECTION INTRAVENOUS; SUBCUTANEOUS at 22:12

## 2019-05-23 RX ADMIN — Medication 500 MILLILITER(S): at 01:30

## 2019-05-23 RX ADMIN — SENNA PLUS 2 TABLET(S): 8.6 TABLET ORAL at 22:11

## 2019-05-23 RX ADMIN — Medication 100 MILLIGRAM(S): at 14:23

## 2019-05-23 RX ADMIN — POTASSIUM PHOSPHATE, MONOBASIC POTASSIUM PHOSPHATE, DIBASIC 63.75 MILLIMOLE(S): 236; 224 INJECTION, SOLUTION INTRAVENOUS at 20:54

## 2019-05-23 RX ADMIN — Medication 2000 MILLIGRAM(S): at 14:11

## 2019-05-23 RX ADMIN — Medication 1 DROP(S): at 12:40

## 2019-05-23 RX ADMIN — Medication 125 MILLILITER(S): at 12:55

## 2019-05-23 RX ADMIN — Medication 100 MILLIGRAM(S): at 22:12

## 2019-05-23 RX ADMIN — Medication 2000 MILLIGRAM(S): at 06:38

## 2019-05-23 RX ADMIN — Medication 20 MILLIGRAM(S): at 05:26

## 2019-05-23 NOTE — PHYSICAL THERAPY INITIAL EVALUATION ADULT - MODALITIES TREATMENT COMMENTS
reviewed incentive spirometer with patient able to pull 500cc, patient also educated on splinted coughing

## 2019-05-23 NOTE — PHYSICAL THERAPY INITIAL EVALUATION ADULT - GENERAL OBSERVATIONS, REHAB EVAL
Patient encountered seated OOB in recliner chair, NAD, +CB, +A-Line, +SCD's b/l, +EKG, +NC02@6L, +KELTON zhong cleared patient to ambulate with PT, spouse present.

## 2019-05-23 NOTE — PHYSICAL THERAPY INITIAL EVALUATION ADULT - IMPAIRMENTS FOUND, PT EVAL
posture/aerobic capacity/endurance/ergonomics and body mechanics/gait, locomotion, and balance/muscle strength

## 2019-05-23 NOTE — PHYSICAL THERAPY INITIAL EVALUATION ADULT - ADDITIONAL COMMENTS
Patient reports living with spouse in private home with 2 steps to enter. Patient has RW, WC, SC in the home, however, endorses no DME use prior to admission.

## 2019-05-23 NOTE — CHART NOTE - NSCHARTNOTEFT_GEN_A_CORE
As per Dr. Kent, pericardial drain removed at bedside without incident. Occlusive dressing applied. Patient tolerated procedure well. Follow up CXR stable, with no obvious ptx

## 2019-05-23 NOTE — PHYSICAL THERAPY INITIAL EVALUATION ADULT - PERTINENT HX OF CURRENT PROBLEM, REHAB EVAL
Patient is a 79 y/o F with chief c/o worsening MO and exercise tolerance of 2 blocks. She had a stress test in Jan 2018 and a LHC on 2/12/18 showing normal coronary arteries. Her last echo on 4/24 showed severe MR with prolapse of the posterior mitral leaflet. and severe eccentric MR, and mild TR, normal LV funciton and severe LA enlargement. the degree of MR had significantly worsened from echo's done in 2018 and 2017. Patient now s/p cardiac procedure.

## 2019-05-23 NOTE — PROGRESS NOTE ADULT - SUBJECTIVE AND OBJECTIVE BOX
CTICU  CRITICAL  CARE  attending     Hand off received 					   Pertinent clinical, laboratory, radiographic, hemodynamic, echocardiographic, respiratory data, microbiologic data and chart were reviewed and analyzed frequently throughout the course of the day and night  Patient seen and examined with CTS/ SH attending at bedside    Pt is a 78y , Female, post op day # 1 s/p minimally invasive MV repair; cryomaze ablation; via Right mini thoracotomy;    post op:    extubated  Right hemothorax  acute post H'gic anemia          , FAMILY HISTORY:  No pertinent family history in first degree relatives  PAST MEDICAL & SURGICAL HISTORY:  Epilepsy  Macular degeneration  Arrhythmia  HTN (hypertension)  Former smoker  No significant past surgical history    Patient is a 78y old  Female who presents with a chief complaint of Mitral Regurgitation (22 May 2019 23:36)      14 system review was unremarkable  acute changes include acute respiratory failure  Vital signs, hemodynamic and respiratory parameters were reviewed from the bedside nursing flowsheet.  ICU Vital Signs Last 24 Hrs  T(C): 36.6 (24 May 2019 10:45), Max: 37.2 (23 May 2019 21:04)  T(F): 97.8 (24 May 2019 10:45), Max: 99 (23 May 2019 21:04)  HR: 74 (24 May 2019 10:00) (70 - 78)  BP: 101/36 (24 May 2019 10:00) (101/36 - 122/57)  BP(mean): 68 (24 May 2019 10:00) (68 - 85)  ABP: 108/40 (24 May 2019 10:00) (100/46 - 154/52)  ABP(mean): 62 (24 May 2019 10:00) (58 - 82)  RR: 31 (24 May 2019 10:00) (18 - 34)  SpO2: 96% (24 May 2019 10:00) (88% - 97%)    Adult Advanced Hemodynamics Last 24 Hrs  CVP(mm Hg): 0 (24 May 2019 10:00) (-5 - 9)  CVP(cm H2O): --  CO: --  CI: --  PA: --  PA(mean): --  PCWP: --  SVR: --  SVRI: --  PVR: --  PVRI: --, ABG - ( 24 May 2019 03:41 )  pH, Arterial: 7.46  pH, Blood: x     /  pCO2: 41    /  pO2: 79    / HCO3: 29    / Base Excess: 4.6   /  SaO2: 96                  Intake and output was reviewed and the fluid balance was calculated  Daily     Daily   I&O's Summary    23 May 2019 07:01  -  24 May 2019 07:00  --------------------------------------------------------  IN: 1630 mL / OUT: 2400 mL / NET: -770 mL    24 May 2019 07:01  -  24 May 2019 10:58  --------------------------------------------------------  IN: 360 mL / OUT: 40 mL / NET: 320 mL        All lines and drain sites were assessed  [facility]      POCT Blood Glucose.: 115 mg/dL (24 May 2019 07:25)    No acute change in mental status  Auscultation of the chest reveals equal bs  Abdomen is soft  Extremities are warm and well perfused  Wounds appear clean and unremarkable  Antibiotics are periop    labs  CBC Full  -  ( 24 May 2019 03:48 )  WBC Count : 12.36 K/uL  RBC Count : 2.56 M/uL  Hemoglobin : 8.4 g/dL  Hematocrit : 25.2 %  Platelet Count - Automated : 106 K/uL  Mean Cell Volume : 98.4 fl  Mean Cell Hemoglobin : 32.8 pg  Mean Cell Hemoglobin Concentration : 33.3 gm/dL  Auto Neutrophil # : x  Auto Lymphocyte # : x  Auto Monocyte # : x  Auto Eosinophil # : x  Auto Basophil # : x  Auto Neutrophil % : x  Auto Lymphocyte % : x  Auto Monocyte % : x  Auto Eosinophil % : x  Auto Basophil % : x    05-24    135  |  97  |  14  ----------------------------<  112<H>  4.1   |  28  |  0.56    Ca    8.3<L>      24 May 2019 03:48  Phos  2.9     05-24  Mg     2.1     05-24    TPro  5.4<L>  /  Alb  3.3  /  TBili  0.8  /  DBili  x   /  AST  31  /  ALT  11  /  AlkPhos  43  05-24    PT/INR - ( 24 May 2019 03:48 )   PT: 14.6 sec;   INR: 1.28          PTT - ( 24 May 2019 03:48 )  PTT:36.9 sec  The current medications were reviewed   MEDICATIONS  (STANDING):  aspirin enteric coated 81 milliGRAM(s) Oral daily  atorvastatin 20 milliGRAM(s) Oral at bedtime  chlorhexidine 2% Cloths 1 Application(s) Topical <User Schedule>  dextrose 5%. 1000 milliLiter(s) (50 mL/Hr) IV Continuous <Continuous>  dextrose 50% Injectable 50 milliLiter(s) IV Push every 15 minutes  dextrose 50% Injectable 25 milliLiter(s) IV Push every 15 minutes  docusate sodium 100 milliGRAM(s) Oral three times a day  heparin  Injectable 5000 Unit(s) SubCutaneous every 8 hours  insulin lispro (HumaLOG) corrective regimen sliding scale   SubCutaneous Before meals and at bedtime  pantoprazole    Tablet 40 milliGRAM(s) Oral before breakfast  PreserVision Eye Vitamin tablet 2 Capsule(s) 2 Capsule(s) Oral daily  senna 2 Tablet(s) Oral at bedtime  sodium chloride 0.9%. 1000 milliLiter(s) (10 mL/Hr) IV Continuous <Continuous>    MEDICATIONS  (PRN):  artificial  tears Solution 1 Drop(s) Both EYES every 3 hours PRN Dry Eyes  dextrose 40% Gel 15 Gram(s) Oral once PRN Blood Glucose LESS THAN 70 milliGRAM(s)/deciliter  glucagon  Injectable 1 milliGRAM(s) IntraMuscular once PRN Glucose LESS THAN 70 milligrams/deciliter       PROBLEM LIST/ ASSESSMENT:  HEALTH ISSUES - PROBLEM Dx:    hemothorax  acute post H'gic anemia  s/p MV repair      ,   Patient is a 78y old  Female who presents with a chief complaint of Mitral Regurgitation (22 May 2019 23:36)     s/p MV repair; min invasive via right mini thoracotomy      My plan includes :  close hemodynamic, ventilatory and drain monitoring and management per post op routine    Monitor for arrhythmias and monitor parameters for organ perfusion  monitor neurologic status  Head of the bed should remain elevated to 45 deg .   chest PT and IS will be encouraged  monitor adequacy of oxygenation and ventilation and attempt to wean oxygen  Nutritional goals will be met using po eventually , ensure adequate caloric intake and montior the same  Stress ulcer and VTE prophylaxis will be achieved    Glycemic control is satisfactory  Electrolytes have been repleted as necessary and wound care has been carried out. Pain control has been achieved.   pattiive physical therapy and early mobility and ambulation goals will be met   The family was updated about the course and plan  CRITICAL CARE TIME SPENT in evaluation and management, reassessments, review and interpretation of labs and x-rays, ventilator and hemodynamic management, formulating a plan and coordinating care: ___53___ MIN.  Time does not include procedural time.  CTICU ATTENDING     					    Jorje Hunter MD

## 2019-05-23 NOTE — PHYSICAL THERAPY INITIAL EVALUATION ADULT - CRITERIA FOR SKILLED THERAPEUTIC INTERVENTIONS
rehab potential/anticipated equipment needs at discharge/anticipated discharge recommendation/impairments found/functional limitations in following categories/therapy frequency/predicted duration of therapy intervention/risk reduction/prevention

## 2019-05-24 LAB
ALBUMIN SERPL ELPH-MCNC: 3.3 G/DL — SIGNIFICANT CHANGE UP (ref 3.3–5)
ALBUMIN SERPL ELPH-MCNC: 3.3 G/DL — SIGNIFICANT CHANGE UP (ref 3.3–5)
ALBUMIN SERPL ELPH-MCNC: 3.7 G/DL — SIGNIFICANT CHANGE UP (ref 3.3–5)
ALBUMIN SERPL ELPH-MCNC: 3.9 G/DL — SIGNIFICANT CHANGE UP (ref 3.3–5)
ALP SERPL-CCNC: 43 U/L — SIGNIFICANT CHANGE UP (ref 40–120)
ALP SERPL-CCNC: 52 U/L — SIGNIFICANT CHANGE UP (ref 40–120)
ALP SERPL-CCNC: 54 U/L — SIGNIFICANT CHANGE UP (ref 40–120)
ALP SERPL-CCNC: 59 U/L — SIGNIFICANT CHANGE UP (ref 40–120)
ALT FLD-CCNC: 11 U/L — SIGNIFICANT CHANGE UP (ref 10–45)
ALT FLD-CCNC: 11 U/L — SIGNIFICANT CHANGE UP (ref 10–45)
ALT FLD-CCNC: 9 U/L — LOW (ref 10–45)
ALT FLD-CCNC: 9 U/L — LOW (ref 10–45)
ANION GAP SERPL CALC-SCNC: 10 MMOL/L — SIGNIFICANT CHANGE UP (ref 5–17)
ANION GAP SERPL CALC-SCNC: 11 MMOL/L — SIGNIFICANT CHANGE UP (ref 5–17)
ANION GAP SERPL CALC-SCNC: 8 MMOL/L — SIGNIFICANT CHANGE UP (ref 5–17)
ANION GAP SERPL CALC-SCNC: 9 MMOL/L — SIGNIFICANT CHANGE UP (ref 5–17)
APTT BLD: 33.6 SEC — SIGNIFICANT CHANGE UP (ref 27.5–36.3)
APTT BLD: 34.8 SEC — SIGNIFICANT CHANGE UP (ref 27.5–36.3)
APTT BLD: 36.6 SEC — HIGH (ref 27.5–36.3)
APTT BLD: 36.9 SEC — HIGH (ref 27.5–36.3)
AST SERPL-CCNC: 24 U/L — SIGNIFICANT CHANGE UP (ref 10–40)
AST SERPL-CCNC: 26 U/L — SIGNIFICANT CHANGE UP (ref 10–40)
AST SERPL-CCNC: 28 U/L — SIGNIFICANT CHANGE UP (ref 10–40)
AST SERPL-CCNC: 31 U/L — SIGNIFICANT CHANGE UP (ref 10–40)
BILIRUB SERPL-MCNC: 0.6 MG/DL — SIGNIFICANT CHANGE UP (ref 0.2–1.2)
BILIRUB SERPL-MCNC: 0.8 MG/DL — SIGNIFICANT CHANGE UP (ref 0.2–1.2)
BILIRUB SERPL-MCNC: 0.8 MG/DL — SIGNIFICANT CHANGE UP (ref 0.2–1.2)
BILIRUB SERPL-MCNC: 0.9 MG/DL — SIGNIFICANT CHANGE UP (ref 0.2–1.2)
BLD GP AB SCN SERPL QL: NEGATIVE — SIGNIFICANT CHANGE UP
BUN SERPL-MCNC: 13 MG/DL — SIGNIFICANT CHANGE UP (ref 7–23)
BUN SERPL-MCNC: 14 MG/DL — SIGNIFICANT CHANGE UP (ref 7–23)
BUN SERPL-MCNC: 14 MG/DL — SIGNIFICANT CHANGE UP (ref 7–23)
BUN SERPL-MCNC: 15 MG/DL — SIGNIFICANT CHANGE UP (ref 7–23)
CALCIUM SERPL-MCNC: 7.6 MG/DL — LOW (ref 8.4–10.5)
CALCIUM SERPL-MCNC: 8.2 MG/DL — LOW (ref 8.4–10.5)
CALCIUM SERPL-MCNC: 8.3 MG/DL — LOW (ref 8.4–10.5)
CALCIUM SERPL-MCNC: 8.8 MG/DL — SIGNIFICANT CHANGE UP (ref 8.4–10.5)
CHLORIDE SERPL-SCNC: 101 MMOL/L — SIGNIFICANT CHANGE UP (ref 96–108)
CHLORIDE SERPL-SCNC: 102 MMOL/L — SIGNIFICANT CHANGE UP (ref 96–108)
CHLORIDE SERPL-SCNC: 97 MMOL/L — SIGNIFICANT CHANGE UP (ref 96–108)
CHLORIDE SERPL-SCNC: 97 MMOL/L — SIGNIFICANT CHANGE UP (ref 96–108)
CO2 SERPL-SCNC: 26 MMOL/L — SIGNIFICANT CHANGE UP (ref 22–31)
CO2 SERPL-SCNC: 26 MMOL/L — SIGNIFICANT CHANGE UP (ref 22–31)
CO2 SERPL-SCNC: 27 MMOL/L — SIGNIFICANT CHANGE UP (ref 22–31)
CO2 SERPL-SCNC: 28 MMOL/L — SIGNIFICANT CHANGE UP (ref 22–31)
CREAT SERPL-MCNC: 0.54 MG/DL — SIGNIFICANT CHANGE UP (ref 0.5–1.3)
CREAT SERPL-MCNC: 0.56 MG/DL — SIGNIFICANT CHANGE UP (ref 0.5–1.3)
GAS PNL BLDA: SIGNIFICANT CHANGE UP
GLUCOSE BLDC GLUCOMTR-MCNC: 115 MG/DL — HIGH (ref 70–99)
GLUCOSE BLDC GLUCOMTR-MCNC: 121 MG/DL — HIGH (ref 70–99)
GLUCOSE BLDC GLUCOMTR-MCNC: 124 MG/DL — HIGH (ref 70–99)
GLUCOSE BLDC GLUCOMTR-MCNC: 139 MG/DL — HIGH (ref 70–99)
GLUCOSE SERPL-MCNC: 112 MG/DL — HIGH (ref 70–99)
GLUCOSE SERPL-MCNC: 118 MG/DL — HIGH (ref 70–99)
GLUCOSE SERPL-MCNC: 127 MG/DL — HIGH (ref 70–99)
GLUCOSE SERPL-MCNC: 144 MG/DL — HIGH (ref 70–99)
HCT VFR BLD CALC: 22.5 % — LOW (ref 34.5–45)
HCT VFR BLD CALC: 24.5 % — LOW (ref 34.5–45)
HCT VFR BLD CALC: 25.2 % — LOW (ref 34.5–45)
HCT VFR BLD CALC: 26.9 % — LOW (ref 34.5–45)
HGB BLD-MCNC: 7.6 G/DL — LOW (ref 11.5–15.5)
HGB BLD-MCNC: 8.2 G/DL — LOW (ref 11.5–15.5)
HGB BLD-MCNC: 8.4 G/DL — LOW (ref 11.5–15.5)
HGB BLD-MCNC: 8.9 G/DL — LOW (ref 11.5–15.5)
INR BLD: 1.07 — SIGNIFICANT CHANGE UP (ref 0.88–1.16)
INR BLD: 1.2 — HIGH (ref 0.88–1.16)
INR BLD: 1.25 — HIGH (ref 0.88–1.16)
INR BLD: 1.28 — HIGH (ref 0.88–1.16)
LACTATE SERPL-SCNC: 0.8 MMOL/L — SIGNIFICANT CHANGE UP (ref 0.5–2)
LACTATE SERPL-SCNC: 0.8 MMOL/L — SIGNIFICANT CHANGE UP (ref 0.5–2)
LACTATE SERPL-SCNC: 0.9 MMOL/L — SIGNIFICANT CHANGE UP (ref 0.5–2)
LACTATE SERPL-SCNC: 1.2 MMOL/L — SIGNIFICANT CHANGE UP (ref 0.5–2)
MAGNESIUM SERPL-MCNC: 2 MG/DL — SIGNIFICANT CHANGE UP (ref 1.6–2.6)
MAGNESIUM SERPL-MCNC: 2.1 MG/DL — SIGNIFICANT CHANGE UP (ref 1.6–2.6)
MAGNESIUM SERPL-MCNC: 2.1 MG/DL — SIGNIFICANT CHANGE UP (ref 1.6–2.6)
MAGNESIUM SERPL-MCNC: 2.2 MG/DL — SIGNIFICANT CHANGE UP (ref 1.6–2.6)
MCHC RBC-ENTMCNC: 32.5 PG — SIGNIFICANT CHANGE UP (ref 27–34)
MCHC RBC-ENTMCNC: 32.8 PG — SIGNIFICANT CHANGE UP (ref 27–34)
MCHC RBC-ENTMCNC: 32.9 PG — SIGNIFICANT CHANGE UP (ref 27–34)
MCHC RBC-ENTMCNC: 33.1 GM/DL — SIGNIFICANT CHANGE UP (ref 32–36)
MCHC RBC-ENTMCNC: 33.3 GM/DL — SIGNIFICANT CHANGE UP (ref 32–36)
MCHC RBC-ENTMCNC: 33.3 PG — SIGNIFICANT CHANGE UP (ref 27–34)
MCHC RBC-ENTMCNC: 33.5 GM/DL — SIGNIFICANT CHANGE UP (ref 32–36)
MCHC RBC-ENTMCNC: 33.8 GM/DL — SIGNIFICANT CHANGE UP (ref 32–36)
MCV RBC AUTO: 98.2 FL — SIGNIFICANT CHANGE UP (ref 80–100)
MCV RBC AUTO: 98.4 FL — SIGNIFICANT CHANGE UP (ref 80–100)
MCV RBC AUTO: 98.4 FL — SIGNIFICANT CHANGE UP (ref 80–100)
MCV RBC AUTO: 98.7 FL — SIGNIFICANT CHANGE UP (ref 80–100)
NRBC # BLD: 0 /100 WBCS — SIGNIFICANT CHANGE UP (ref 0–0)
PHOSPHATE SERPL-MCNC: 1.5 MG/DL — LOW (ref 2.5–4.5)
PHOSPHATE SERPL-MCNC: 2.2 MG/DL — LOW (ref 2.5–4.5)
PHOSPHATE SERPL-MCNC: 2.9 MG/DL — SIGNIFICANT CHANGE UP (ref 2.5–4.5)
PHOSPHATE SERPL-MCNC: 3.4 MG/DL — SIGNIFICANT CHANGE UP (ref 2.5–4.5)
PLATELET # BLD AUTO: 100 K/UL — LOW (ref 150–400)
PLATELET # BLD AUTO: 101 K/UL — LOW (ref 150–400)
PLATELET # BLD AUTO: 106 K/UL — LOW (ref 150–400)
PLATELET # BLD AUTO: 110 K/UL — LOW (ref 150–400)
POTASSIUM SERPL-MCNC: 3.9 MMOL/L — SIGNIFICANT CHANGE UP (ref 3.5–5.3)
POTASSIUM SERPL-MCNC: 4.1 MMOL/L — SIGNIFICANT CHANGE UP (ref 3.5–5.3)
POTASSIUM SERPL-MCNC: 4.4 MMOL/L — SIGNIFICANT CHANGE UP (ref 3.5–5.3)
POTASSIUM SERPL-MCNC: 5 MMOL/L — SIGNIFICANT CHANGE UP (ref 3.5–5.3)
POTASSIUM SERPL-SCNC: 3.9 MMOL/L — SIGNIFICANT CHANGE UP (ref 3.5–5.3)
POTASSIUM SERPL-SCNC: 4.1 MMOL/L — SIGNIFICANT CHANGE UP (ref 3.5–5.3)
POTASSIUM SERPL-SCNC: 4.4 MMOL/L — SIGNIFICANT CHANGE UP (ref 3.5–5.3)
POTASSIUM SERPL-SCNC: 5 MMOL/L — SIGNIFICANT CHANGE UP (ref 3.5–5.3)
PROT SERPL-MCNC: 5.1 G/DL — LOW (ref 6–8.3)
PROT SERPL-MCNC: 5.4 G/DL — LOW (ref 6–8.3)
PROT SERPL-MCNC: 5.7 G/DL — LOW (ref 6–8.3)
PROT SERPL-MCNC: 5.9 G/DL — LOW (ref 6–8.3)
PROTHROM AB SERPL-ACNC: 12.1 SEC — SIGNIFICANT CHANGE UP (ref 10–12.9)
PROTHROM AB SERPL-ACNC: 13.6 SEC — HIGH (ref 10–12.9)
PROTHROM AB SERPL-ACNC: 14.2 SEC — HIGH (ref 10–12.9)
PROTHROM AB SERPL-ACNC: 14.6 SEC — HIGH (ref 10–12.9)
RBC # BLD: 2.28 M/UL — LOW (ref 3.8–5.2)
RBC # BLD: 2.49 M/UL — LOW (ref 3.8–5.2)
RBC # BLD: 2.56 M/UL — LOW (ref 3.8–5.2)
RBC # BLD: 2.74 M/UL — LOW (ref 3.8–5.2)
RBC # FLD: 14.1 % — SIGNIFICANT CHANGE UP (ref 10.3–14.5)
RBC # FLD: 14.1 % — SIGNIFICANT CHANGE UP (ref 10.3–14.5)
RBC # FLD: 14.3 % — SIGNIFICANT CHANGE UP (ref 10.3–14.5)
RBC # FLD: 14.5 % — SIGNIFICANT CHANGE UP (ref 10.3–14.5)
RH IG SCN BLD-IMP: POSITIVE — SIGNIFICANT CHANGE UP
SODIUM SERPL-SCNC: 135 MMOL/L — SIGNIFICANT CHANGE UP (ref 135–145)
SODIUM SERPL-SCNC: 135 MMOL/L — SIGNIFICANT CHANGE UP (ref 135–145)
SODIUM SERPL-SCNC: 136 MMOL/L — SIGNIFICANT CHANGE UP (ref 135–145)
SODIUM SERPL-SCNC: 136 MMOL/L — SIGNIFICANT CHANGE UP (ref 135–145)
WBC # BLD: 11.13 K/UL — HIGH (ref 3.8–10.5)
WBC # BLD: 11.29 K/UL — HIGH (ref 3.8–10.5)
WBC # BLD: 11.92 K/UL — HIGH (ref 3.8–10.5)
WBC # BLD: 12.36 K/UL — HIGH (ref 3.8–10.5)
WBC # FLD AUTO: 11.13 K/UL — HIGH (ref 3.8–10.5)
WBC # FLD AUTO: 11.29 K/UL — HIGH (ref 3.8–10.5)
WBC # FLD AUTO: 11.92 K/UL — HIGH (ref 3.8–10.5)
WBC # FLD AUTO: 12.36 K/UL — HIGH (ref 3.8–10.5)

## 2019-05-24 PROCEDURE — 76604 US EXAM CHEST: CPT | Mod: 26

## 2019-05-24 PROCEDURE — 71045 X-RAY EXAM CHEST 1 VIEW: CPT | Mod: 26

## 2019-05-24 PROCEDURE — 32556 INSERT CATH PLEURA W/O IMAGE: CPT

## 2019-05-24 PROCEDURE — 71045 X-RAY EXAM CHEST 1 VIEW: CPT | Mod: 26,77

## 2019-05-24 RX ORDER — POTASSIUM CHLORIDE 20 MEQ
20 PACKET (EA) ORAL ONCE
Refills: 0 | Status: COMPLETED | OUTPATIENT
Start: 2019-05-24 | End: 2019-05-24

## 2019-05-24 RX ORDER — POTASSIUM PHOSPHATE, MONOBASIC POTASSIUM PHOSPHATE, DIBASIC 236; 224 MG/ML; MG/ML
30 INJECTION, SOLUTION INTRAVENOUS ONCE
Refills: 0 | Status: COMPLETED | OUTPATIENT
Start: 2019-05-24 | End: 2019-05-24

## 2019-05-24 RX ORDER — ALBUMIN HUMAN 25 %
50 VIAL (ML) INTRAVENOUS
Refills: 0 | Status: COMPLETED | OUTPATIENT
Start: 2019-05-24 | End: 2019-05-24

## 2019-05-24 RX ORDER — POTASSIUM PHOSPHATE, MONOBASIC POTASSIUM PHOSPHATE, DIBASIC 236; 224 MG/ML; MG/ML
15 INJECTION, SOLUTION INTRAVENOUS ONCE
Refills: 0 | Status: COMPLETED | OUTPATIENT
Start: 2019-05-24 | End: 2019-05-24

## 2019-05-24 RX ADMIN — Medication 100 MILLIGRAM(S): at 14:14

## 2019-05-24 RX ADMIN — Medication 81 MILLIGRAM(S): at 14:14

## 2019-05-24 RX ADMIN — Medication 50 MILLILITER(S): at 08:30

## 2019-05-24 RX ADMIN — HEPARIN SODIUM 5000 UNIT(S): 5000 INJECTION INTRAVENOUS; SUBCUTANEOUS at 14:13

## 2019-05-24 RX ADMIN — Medication 1 DROP(S): at 14:15

## 2019-05-24 RX ADMIN — Medication 50 GRAM(S): at 00:26

## 2019-05-24 RX ADMIN — HEPARIN SODIUM 5000 UNIT(S): 5000 INJECTION INTRAVENOUS; SUBCUTANEOUS at 05:31

## 2019-05-24 RX ADMIN — Medication 100 MILLIEQUIVALENT(S): at 19:25

## 2019-05-24 RX ADMIN — CHLORHEXIDINE GLUCONATE 1 APPLICATION(S): 213 SOLUTION TOPICAL at 05:31

## 2019-05-24 RX ADMIN — Medication 1 DROP(S): at 05:31

## 2019-05-24 RX ADMIN — Medication 100 MILLIGRAM(S): at 22:35

## 2019-05-24 RX ADMIN — POTASSIUM PHOSPHATE, MONOBASIC POTASSIUM PHOSPHATE, DIBASIC 83.33 MILLIMOLE(S): 236; 224 INJECTION, SOLUTION INTRAVENOUS at 19:17

## 2019-05-24 RX ADMIN — SENNA PLUS 2 TABLET(S): 8.6 TABLET ORAL at 22:35

## 2019-05-24 RX ADMIN — ATORVASTATIN CALCIUM 20 MILLIGRAM(S): 80 TABLET, FILM COATED ORAL at 22:35

## 2019-05-24 RX ADMIN — Medication 100 MILLIGRAM(S): at 05:31

## 2019-05-24 RX ADMIN — PANTOPRAZOLE SODIUM 40 MILLIGRAM(S): 20 TABLET, DELAYED RELEASE ORAL at 07:28

## 2019-05-24 RX ADMIN — HEPARIN SODIUM 5000 UNIT(S): 5000 INJECTION INTRAVENOUS; SUBCUTANEOUS at 22:35

## 2019-05-24 RX ADMIN — Medication 50 MILLILITER(S): at 08:28

## 2019-05-24 NOTE — DIETITIAN INITIAL EVALUATION ADULT. - PERTINENT LABORATORY DATA
5/24: hemoglobin 8.4, hematocrit 25.2, glucose 112, calcium 8.3, 5/21: 5.6%, POCT: 5/24: 115, 5/23: , 5/22:

## 2019-05-24 NOTE — CHART NOTE - NSCHARTNOTEFT_GEN_A_CORE
Exam:  US Chest    Procedure Date:    History: 78y Female whose CXR today shows a possible right sided pleural effusion.  Pt is POD #2 from MV repair, Cryoablation MAZE and LESLIE clip     Findings:                    Evaluation of the right side of the thoracic cavity demonstrates                   moderate - large pleural effusion                  Lung is freely movable with in thoracic cavity    Impression: moderate- large right pleural effusion

## 2019-05-24 NOTE — DIETITIAN INITIAL EVALUATION ADULT. - ENERGY NEEDS
Ht: 5'2", Wt: 54kg, IBW: 50kg, 108%IBW.   Needs calculated based on [facility] standards of care. Needs adjusted for age, post op

## 2019-05-24 NOTE — CHART NOTE - NSCHARTNOTEFT_GEN_A_CORE
As per Dr. Kent, right pleural chest tube removed at bedside without incident. U stitch tied down in place and occlusive dressing applied. Patient tolerated procedure well. Chest tube was noted to be clotted off once removed, due to persistent effusion R sided pigtail was placed. Follow up CXR was stable with no obvious ptx

## 2019-05-24 NOTE — DIETITIAN INITIAL EVALUATION ADULT. - NS AS NUTRI INTERV MEALS SNACK
Recommend continue current diet order. Consider liberalized diet if PO intake decreases Recommend continue current diet order. Consider liberalized diet if PO intake decreases. Monitor BG, monitor need for addition of cstCHO diet order.

## 2019-05-25 LAB
ALBUMIN SERPL ELPH-MCNC: 3.4 G/DL — SIGNIFICANT CHANGE UP (ref 3.3–5)
ALP SERPL-CCNC: 44 U/L — SIGNIFICANT CHANGE UP (ref 40–120)
ALT FLD-CCNC: 8 U/L — LOW (ref 10–45)
ANION GAP SERPL CALC-SCNC: 9 MMOL/L — SIGNIFICANT CHANGE UP (ref 5–17)
APTT BLD: 34.2 SEC — SIGNIFICANT CHANGE UP (ref 27.5–36.3)
AST SERPL-CCNC: 22 U/L — SIGNIFICANT CHANGE UP (ref 10–40)
BILIRUB SERPL-MCNC: 0.9 MG/DL — SIGNIFICANT CHANGE UP (ref 0.2–1.2)
BUN SERPL-MCNC: 11 MG/DL — SIGNIFICANT CHANGE UP (ref 7–23)
CALCIUM SERPL-MCNC: 7.9 MG/DL — LOW (ref 8.4–10.5)
CHLORIDE SERPL-SCNC: 104 MMOL/L — SIGNIFICANT CHANGE UP (ref 96–108)
CO2 SERPL-SCNC: 27 MMOL/L — SIGNIFICANT CHANGE UP (ref 22–31)
CREAT SERPL-MCNC: 0.47 MG/DL — LOW (ref 0.5–1.3)
GLUCOSE BLDC GLUCOMTR-MCNC: 123 MG/DL — HIGH (ref 70–99)
GLUCOSE BLDC GLUCOMTR-MCNC: 123 MG/DL — HIGH (ref 70–99)
GLUCOSE BLDC GLUCOMTR-MCNC: 142 MG/DL — HIGH (ref 70–99)
GLUCOSE BLDC GLUCOMTR-MCNC: 183 MG/DL — HIGH (ref 70–99)
GLUCOSE SERPL-MCNC: 105 MG/DL — HIGH (ref 70–99)
HCT VFR BLD CALC: 26 % — LOW (ref 34.5–45)
HGB BLD-MCNC: 8.6 G/DL — LOW (ref 11.5–15.5)
INR BLD: 1.14 — SIGNIFICANT CHANGE UP (ref 0.88–1.16)
LACTATE SERPL-SCNC: 0.5 MMOL/L — SIGNIFICANT CHANGE UP (ref 0.5–2)
MAGNESIUM SERPL-MCNC: 2 MG/DL — SIGNIFICANT CHANGE UP (ref 1.6–2.6)
MCHC RBC-ENTMCNC: 32.6 PG — SIGNIFICANT CHANGE UP (ref 27–34)
MCHC RBC-ENTMCNC: 33.1 GM/DL — SIGNIFICANT CHANGE UP (ref 32–36)
MCV RBC AUTO: 98.5 FL — SIGNIFICANT CHANGE UP (ref 80–100)
NRBC # BLD: 0 /100 WBCS — SIGNIFICANT CHANGE UP (ref 0–0)
PHOSPHATE SERPL-MCNC: 2.7 MG/DL — SIGNIFICANT CHANGE UP (ref 2.5–4.5)
PLATELET # BLD AUTO: 96 K/UL — LOW (ref 150–400)
POTASSIUM SERPL-MCNC: 4.4 MMOL/L — SIGNIFICANT CHANGE UP (ref 3.5–5.3)
POTASSIUM SERPL-SCNC: 4.4 MMOL/L — SIGNIFICANT CHANGE UP (ref 3.5–5.3)
PROT SERPL-MCNC: 5.4 G/DL — LOW (ref 6–8.3)
PROTHROM AB SERPL-ACNC: 12.9 SEC — SIGNIFICANT CHANGE UP (ref 10–12.9)
RBC # BLD: 2.64 M/UL — LOW (ref 3.8–5.2)
RBC # FLD: 15 % — HIGH (ref 10.3–14.5)
SODIUM SERPL-SCNC: 140 MMOL/L — SIGNIFICANT CHANGE UP (ref 135–145)
WBC # BLD: 9.28 K/UL — SIGNIFICANT CHANGE UP (ref 3.8–10.5)
WBC # FLD AUTO: 9.28 K/UL — SIGNIFICANT CHANGE UP (ref 3.8–10.5)

## 2019-05-25 PROCEDURE — 99291 CRITICAL CARE FIRST HOUR: CPT

## 2019-05-25 PROCEDURE — 71045 X-RAY EXAM CHEST 1 VIEW: CPT | Mod: 26

## 2019-05-25 RX ORDER — METOPROLOL TARTRATE 50 MG
12.5 TABLET ORAL EVERY 12 HOURS
Refills: 0 | Status: DISCONTINUED | OUTPATIENT
Start: 2019-05-25 | End: 2019-05-25

## 2019-05-25 RX ORDER — FUROSEMIDE 40 MG
20 TABLET ORAL ONCE
Refills: 0 | Status: DISCONTINUED | OUTPATIENT
Start: 2019-05-25 | End: 2019-05-25

## 2019-05-25 RX ORDER — POTASSIUM CHLORIDE 20 MEQ
20 PACKET (EA) ORAL DAILY
Refills: 0 | Status: DISCONTINUED | OUTPATIENT
Start: 2019-05-25 | End: 2019-05-29

## 2019-05-25 RX ORDER — METOPROLOL TARTRATE 50 MG
12.5 TABLET ORAL EVERY 6 HOURS
Refills: 0 | Status: DISCONTINUED | OUTPATIENT
Start: 2019-05-25 | End: 2019-05-28

## 2019-05-25 RX ORDER — LACTULOSE 10 G/15ML
20 SOLUTION ORAL ONCE
Refills: 0 | Status: COMPLETED | OUTPATIENT
Start: 2019-05-25 | End: 2019-05-25

## 2019-05-25 RX ORDER — FUROSEMIDE 40 MG
40 TABLET ORAL DAILY
Refills: 0 | Status: DISCONTINUED | OUTPATIENT
Start: 2019-05-25 | End: 2019-05-29

## 2019-05-25 RX ADMIN — HEPARIN SODIUM 5000 UNIT(S): 5000 INJECTION INTRAVENOUS; SUBCUTANEOUS at 13:59

## 2019-05-25 RX ADMIN — PANTOPRAZOLE SODIUM 40 MILLIGRAM(S): 20 TABLET, DELAYED RELEASE ORAL at 07:40

## 2019-05-25 RX ADMIN — Medication 1: at 16:53

## 2019-05-25 RX ADMIN — CHLORHEXIDINE GLUCONATE 1 APPLICATION(S): 213 SOLUTION TOPICAL at 05:22

## 2019-05-25 RX ADMIN — Medication 20 MILLIEQUIVALENT(S): at 11:49

## 2019-05-25 RX ADMIN — Medication 81 MILLIGRAM(S): at 11:49

## 2019-05-25 RX ADMIN — HEPARIN SODIUM 5000 UNIT(S): 5000 INJECTION INTRAVENOUS; SUBCUTANEOUS at 21:05

## 2019-05-25 RX ADMIN — LACTULOSE 20 GRAM(S): 10 SOLUTION ORAL at 14:34

## 2019-05-25 RX ADMIN — Medication 100 MILLIGRAM(S): at 05:25

## 2019-05-25 RX ADMIN — Medication 40 MILLIGRAM(S): at 11:49

## 2019-05-25 RX ADMIN — Medication 12.5 MILLIGRAM(S): at 20:54

## 2019-05-25 RX ADMIN — Medication 100 MILLIGRAM(S): at 14:00

## 2019-05-25 RX ADMIN — HEPARIN SODIUM 5000 UNIT(S): 5000 INJECTION INTRAVENOUS; SUBCUTANEOUS at 05:25

## 2019-05-25 RX ADMIN — ATORVASTATIN CALCIUM 20 MILLIGRAM(S): 80 TABLET, FILM COATED ORAL at 21:05

## 2019-05-25 NOTE — PROGRESS NOTE ADULT - SUBJECTIVE AND OBJECTIVE BOX
CTICU  CRITICAL  CARE  ATTENDING:   				   Pertinent clinical, laboratory, radiographic, hemodynamic, echocardiographic, respiratory data, microbiologic data and chart were reviewed and analyzed frequently throughout the course of the day and night    Patient seen and examined with CTS/ SH attending at bedside    Pt is a 78y Female s/p minimally invasive MV repair, cryomaze     HEALTH ISSUES - PROBLEM Dx:         FAMILY HISTORY:  No pertinent family history in first degree relatives  PAST MEDICAL & SURGICAL HISTORY:  Epilepsy  Macular degeneration  Arrhythmia  HTN (hypertension)  Former smoker  No significant past surgical history      14 system review was unremarkable  acute changes include acute respiratory failure    Vital signs, hemodynamic and respiratory parameters were reviewed from the bedside nursing flowsheet.  ICU Vital Signs Last 24 Hrs  T(C): 36.9 (25 May 2019 10:00), Max: 36.9 (25 May 2019 10:00)  T(F): 98.5 (25 May 2019 10:00), Max: 98.5 (25 May 2019 10:00)  HR: 78 (25 May 2019 12:00) (72 - 78)  BP: 107/43 (25 May 2019 12:00) (98/39 - 133/53)  BP(mean): 74 (25 May 2019 12:00) (64 - 92)  ABP: 118/42 (24 May 2019 17:00) (114/42 - 128/46)  ABP(mean): 66 (24 May 2019 17:00) (64 - 72)  RR: 31 (25 May 2019 12:00) (18 - 33)  SpO2: 99% (25 May 2019 12:00) (82% - 100%)    Adult Advanced Hemodynamics Last 24 Hrs  CVP(mm Hg): 4 (24 May 2019 17:10) (4 - 19)  CVP(cm H2O): --  CO: --  CI: --  PA: --  PA(mean): --  PCWP: --  SVR: --  SVRI: --  PVR: --  PVRI: --, ABG - ( 24 May 2019 16:56 )  pH, Arterial: 7.47  pH, Blood: x     /  pCO2: 40    /  pO2: 79    / HCO3: 29    / Base Excess: 4.6   /  SaO2: 96                  Intake and output was reviewed and the fluid balance was calculated  Daily     Daily   I&O's Summary    24 May 2019 07:01  -  25 May 2019 07:00  --------------------------------------------------------  IN: 1896.6 mL / OUT: 1935 mL / NET: -38.4 mL    25 May 2019 07:01  -  25 May 2019 13:44  --------------------------------------------------------  IN: 230 mL / OUT: 600 mL / NET: -370 mL        All lines and drain sites were assessed  [facility]      POCT Blood Glucose.: 123 mg/dL (25 May 2019 11:38)        Exam:   Gen: NAD   Neuro: Mental status at baseline  Lungs: Auscultation of the chest reveals equal bs  Abd: soft, nt/nd  Ext: warm and well perfused  Wound: appears clean and unremarkable  Antibiotics are periop      labs  CBC Full  -  ( 25 May 2019 03:45 )  WBC Count : 9.28 K/uL  RBC Count : 2.64 M/uL  Hemoglobin : 8.6 g/dL  Hematocrit : 26.0 %  Platelet Count - Automated : 96 K/uL  Mean Cell Volume : 98.5 fl  Mean Cell Hemoglobin : 32.6 pg  Mean Cell Hemoglobin Concentration : 33.1 gm/dL  Auto Neutrophil # : x  Auto Lymphocyte # : x  Auto Monocyte # : x  Auto Eosinophil # : x  Auto Basophil # : x  Auto Neutrophil % : x  Auto Lymphocyte % : x  Auto Monocyte % : x  Auto Eosinophil % : x  Auto Basophil % : x    05-25    140  |  104  |  11  ----------------------------<  105<H>  4.4   |  27  |  0.47<L>    Ca    7.9<L>      25 May 2019 03:45  Phos  2.7     05-25  Mg     2.0     05-25    TPro  5.4<L>  /  Alb  3.4  /  TBili  0.9  /  DBili  x   /  AST  22  /  ALT  8<L>  /  AlkPhos  44  05-25    PT/INR - ( 25 May 2019 03:45 )   PT: 12.9 sec;   INR: 1.14          PTT - ( 25 May 2019 03:45 )  PTT:34.2 sec    The current medications were reviewed   MEDICATIONS  (STANDING):  aspirin enteric coated 81 milliGRAM(s) Oral daily  atorvastatin 20 milliGRAM(s) Oral at bedtime  chlorhexidine 2% Cloths 1 Application(s) Topical <User Schedule>  dextrose 5%. 1000 milliLiter(s) (50 mL/Hr) IV Continuous <Continuous>  dextrose 50% Injectable 50 milliLiter(s) IV Push every 15 minutes  dextrose 50% Injectable 25 milliLiter(s) IV Push every 15 minutes  docusate sodium 100 milliGRAM(s) Oral three times a day  furosemide    Tablet 40 milliGRAM(s) Oral daily  heparin  Injectable 5000 Unit(s) SubCutaneous every 8 hours  insulin lispro (HumaLOG) corrective regimen sliding scale   SubCutaneous Before meals and at bedtime  pantoprazole    Tablet 40 milliGRAM(s) Oral before breakfast  potassium chloride    Tablet ER 20 milliEquivalent(s) Oral daily  PreserVision Eye Vitamin tablet 2 Capsule(s) 2 Capsule(s) Oral daily  senna 2 Tablet(s) Oral at bedtime  sodium chloride 0.9%. 1000 milliLiter(s) (10 mL/Hr) IV Continuous <Continuous>    MEDICATIONS  (PRN):  artificial  tears Solution 1 Drop(s) Both EYES every 3 hours PRN Dry Eyes  dextrose 40% Gel 15 Gram(s) Oral once PRN Blood Glucose LESS THAN 70 milliGRAM(s)/deciliter  glucagon  Injectable 1 milliGRAM(s) IntraMuscular once PRN Glucose LESS THAN 70 milligrams/deciliter       PROBLEM LIST/ ASSESSMENT:  HEALTH ISSUES - PROBLEM Dx:         Patient is a 78y old  Female who presents with a chief complaint of Mitral Regurgitation (23 May 2019 15:57)        My plan includes :  -diuretic;   -plan to remove chest tube today  -Close hemodynamic, ventilatory and drain monitoring and management per post op routine  -Monitor for arrhythmias and monitor parameters for organ perfusion  -Monitor neurologic status  -Head of the bed should remain elevated to 45 deg .   -Chest PT and IS will be encouraged  -Monitor adequacy of oxygenation and ventilation and attempt to wean oxygen  -Nutritional goals will be met using po eventually , ensure adequate caloric intake and montior the same  -Stress ulcer and VTE prophylaxis will be achieved    -Glycemic control is satisfactory  -Electrolytes have been repleated as necessary and wound care has been carried out. Pain control has been achieved.   -Agressive physical therapy and early mobility and ambulation goals will be met   The family was updated about the course and plan    CRITICAL CARE TIME SPENT in evaluation and management, reassessments, review and interpretation of labs and x-rays, ventilator and hemodynamic management, formulating a plan and coordinating care: ___60____ MIN.  Time does not include procedural time.      CTICU ATTENDING:      		  José Manuel Harp MD

## 2019-05-25 NOTE — CHART NOTE - NSCHARTNOTEFT_GEN_A_CORE
CT Removal:    Pt seen and examined at bedside.  Case discussed with Dr. Martínez.  Minimal output from pigtail.  No air leak appreciated.  CT removed without incident per Dr. Martínez request.  Occlusive DSD placed.  CXR no obvious PTX noted.  Pt tolerated procedure well.

## 2019-05-26 LAB
ALBUMIN SERPL ELPH-MCNC: 3.5 G/DL — SIGNIFICANT CHANGE UP (ref 3.3–5)
ALP SERPL-CCNC: 55 U/L — SIGNIFICANT CHANGE UP (ref 40–120)
ALT FLD-CCNC: 12 U/L — SIGNIFICANT CHANGE UP (ref 10–45)
ANION GAP SERPL CALC-SCNC: 9 MMOL/L — SIGNIFICANT CHANGE UP (ref 5–17)
APTT BLD: 44.8 SEC — HIGH (ref 27.5–36.3)
AST SERPL-CCNC: 22 U/L — SIGNIFICANT CHANGE UP (ref 10–40)
BILIRUB SERPL-MCNC: 0.8 MG/DL — SIGNIFICANT CHANGE UP (ref 0.2–1.2)
BUN SERPL-MCNC: 8 MG/DL — SIGNIFICANT CHANGE UP (ref 7–23)
CALCIUM SERPL-MCNC: 8.7 MG/DL — SIGNIFICANT CHANGE UP (ref 8.4–10.5)
CHLORIDE SERPL-SCNC: 104 MMOL/L — SIGNIFICANT CHANGE UP (ref 96–108)
CO2 SERPL-SCNC: 28 MMOL/L — SIGNIFICANT CHANGE UP (ref 22–31)
CREAT SERPL-MCNC: 0.48 MG/DL — LOW (ref 0.5–1.3)
GLUCOSE BLDC GLUCOMTR-MCNC: 104 MG/DL — HIGH (ref 70–99)
GLUCOSE BLDC GLUCOMTR-MCNC: 115 MG/DL — HIGH (ref 70–99)
GLUCOSE BLDC GLUCOMTR-MCNC: 116 MG/DL — HIGH (ref 70–99)
GLUCOSE BLDC GLUCOMTR-MCNC: 118 MG/DL — HIGH (ref 70–99)
GLUCOSE SERPL-MCNC: 103 MG/DL — HIGH (ref 70–99)
HCT VFR BLD CALC: 27.6 % — LOW (ref 34.5–45)
HGB BLD-MCNC: 9.3 G/DL — LOW (ref 11.5–15.5)
INR BLD: 1.07 — SIGNIFICANT CHANGE UP (ref 0.88–1.16)
MAGNESIUM SERPL-MCNC: 2 MG/DL — SIGNIFICANT CHANGE UP (ref 1.6–2.6)
MCHC RBC-ENTMCNC: 33 PG — SIGNIFICANT CHANGE UP (ref 27–34)
MCHC RBC-ENTMCNC: 33.7 GM/DL — SIGNIFICANT CHANGE UP (ref 32–36)
MCV RBC AUTO: 97.9 FL — SIGNIFICANT CHANGE UP (ref 80–100)
NRBC # BLD: 0 /100 WBCS — SIGNIFICANT CHANGE UP (ref 0–0)
PHOSPHATE SERPL-MCNC: 2.1 MG/DL — LOW (ref 2.5–4.5)
PLATELET # BLD AUTO: 144 K/UL — LOW (ref 150–400)
POTASSIUM SERPL-MCNC: 4.1 MMOL/L — SIGNIFICANT CHANGE UP (ref 3.5–5.3)
POTASSIUM SERPL-SCNC: 4.1 MMOL/L — SIGNIFICANT CHANGE UP (ref 3.5–5.3)
PROT SERPL-MCNC: 5.9 G/DL — LOW (ref 6–8.3)
PROTHROM AB SERPL-ACNC: 12.1 SEC — SIGNIFICANT CHANGE UP (ref 10–12.9)
RBC # BLD: 2.82 M/UL — LOW (ref 3.8–5.2)
RBC # FLD: 14.6 % — HIGH (ref 10.3–14.5)
SODIUM SERPL-SCNC: 141 MMOL/L — SIGNIFICANT CHANGE UP (ref 135–145)
WBC # BLD: 8.43 K/UL — SIGNIFICANT CHANGE UP (ref 3.8–10.5)
WBC # FLD AUTO: 8.43 K/UL — SIGNIFICANT CHANGE UP (ref 3.8–10.5)

## 2019-05-26 PROCEDURE — 71045 X-RAY EXAM CHEST 1 VIEW: CPT | Mod: 26

## 2019-05-26 PROCEDURE — 99232 SBSQ HOSP IP/OBS MODERATE 35: CPT

## 2019-05-26 RX ORDER — SODIUM,POTASSIUM PHOSPHATES 278-250MG
1 POWDER IN PACKET (EA) ORAL
Refills: 0 | Status: COMPLETED | OUTPATIENT
Start: 2019-05-26 | End: 2019-05-26

## 2019-05-26 RX ORDER — POTASSIUM PHOSPHATE, MONOBASIC POTASSIUM PHOSPHATE, DIBASIC 236; 224 MG/ML; MG/ML
15 INJECTION, SOLUTION INTRAVENOUS ONCE
Refills: 0 | Status: COMPLETED | OUTPATIENT
Start: 2019-05-26 | End: 2019-05-26

## 2019-05-26 RX ORDER — FUROSEMIDE 40 MG
20 TABLET ORAL ONCE
Refills: 0 | Status: COMPLETED | OUTPATIENT
Start: 2019-05-26 | End: 2019-05-26

## 2019-05-26 RX ADMIN — POTASSIUM PHOSPHATE, MONOBASIC POTASSIUM PHOSPHATE, DIBASIC 63.75 MILLIMOLE(S): 236; 224 INJECTION, SOLUTION INTRAVENOUS at 11:00

## 2019-05-26 RX ADMIN — PANTOPRAZOLE SODIUM 40 MILLIGRAM(S): 20 TABLET, DELAYED RELEASE ORAL at 06:47

## 2019-05-26 RX ADMIN — Medication 40 MILLIGRAM(S): at 05:57

## 2019-05-26 RX ADMIN — HEPARIN SODIUM 5000 UNIT(S): 5000 INJECTION INTRAVENOUS; SUBCUTANEOUS at 05:57

## 2019-05-26 RX ADMIN — Medication 12.5 MILLIGRAM(S): at 11:04

## 2019-05-26 RX ADMIN — Medication 12.5 MILLIGRAM(S): at 00:34

## 2019-05-26 RX ADMIN — Medication 20 MILLIGRAM(S): at 07:08

## 2019-05-26 RX ADMIN — Medication 81 MILLIGRAM(S): at 11:04

## 2019-05-26 RX ADMIN — Medication 12.5 MILLIGRAM(S): at 17:01

## 2019-05-26 RX ADMIN — Medication 12.5 MILLIGRAM(S): at 05:57

## 2019-05-26 RX ADMIN — Medication 12.5 MILLIGRAM(S): at 23:05

## 2019-05-26 RX ADMIN — HEPARIN SODIUM 5000 UNIT(S): 5000 INJECTION INTRAVENOUS; SUBCUTANEOUS at 15:00

## 2019-05-26 RX ADMIN — CHLORHEXIDINE GLUCONATE 1 APPLICATION(S): 213 SOLUTION TOPICAL at 05:57

## 2019-05-26 RX ADMIN — Medication 1 TABLET(S): at 07:12

## 2019-05-26 RX ADMIN — HEPARIN SODIUM 5000 UNIT(S): 5000 INJECTION INTRAVENOUS; SUBCUTANEOUS at 21:10

## 2019-05-26 RX ADMIN — ATORVASTATIN CALCIUM 20 MILLIGRAM(S): 80 TABLET, FILM COATED ORAL at 21:10

## 2019-05-26 NOTE — PROGRESS NOTE ADULT - SUBJECTIVE AND OBJECTIVE BOX
INTERVAL HPI/OVERNIGHT EVENTS:    POD#4 Minimally invasive MV repair/CryoMAZE  EF normal    79yo Female with Hx HTN, Fib - ablation '18, epilepsy, known mitral valve regurgitation/prolapse with sxs progressive SOB/MO.    Stress test (+) Jan 2018  Cath 2/12/18: normal coronary arteries.   ECHO 4/24: EF normal; severe MR with prolapse of the posterior mitral leaflet. severe eccentric MR, and mild TR, normal LV function and severe LA enlargement. the degree of MR had significantly worsened from prior ECHO    CT Chest 5/21 - Mild fusiform aneurysmal dilatation of the ascending thoracic aorta. Central and peripheral pulmonary arterial hypertension. No pulmonary parenchymal pathology.    to OR 5/22 - no blood products and arrived to ICU on no infusions  extubated in short post-op period  serially chest tubes removed    awaiting bed availability on floor  no acute events reported overnight    PMHx includes but is not limited to:   Epilepsy  Macular degeneration  Arrhythmia  HTN (hypertension)  Former smoker    ICU Vital Signs Last 24 Hrs  T(C): 36.2 (26 May 2019 13:27), Max: 36.7 (25 May 2019 21:17)  T(F): 97.2 (26 May 2019 13:27), Max: 98.1 (26 May 2019 01:01)  HR: 88 (26 May 2019 15:00) (72 - 114)  BP: 102/58 (26 May 2019 15:00) (96/56 - 141/48)  BP(mean): 75 (26 May 2019 15:00) (0 - 94)  SpO2: 97% (26 May 2019 15:00) (94% - 100%) RA    Qtts: None    I&O's Summary    25 May 2019 07:01  -  26 May 2019 07:00  --------------------------------------------------------  IN: 900 mL / OUT: 1420 mL / NET: -520 mL    26 May 2019 07:01  -  26 May 2019 15:25  --------------------------------------------------------  IN: 150 mL / OUT: 300 mL / NET: -150 mL    Physical Exam    Heart - regular (-)rub/gallop  Lungs - CTA anterior/posterior; dec BS at bases - no wheeze  Abd - (+)BS soft NTND (-)r/r/g  Ext - warm to touch; no cyanosis/clubbing  Chest - incision site clean and dry  Neuro  - alert/oriented and interactive - non-focal  Skin - no rash    LABS:                        9.3    8.43  )-----------( 144      ( 26 May 2019 02:46 )             27.6     05-26    141  |  104  |  8   ----------------------------<  103<H>  4.1   |  28  |  0.48<L>    Ca    8.7      26 May 2019 02:46  Phos  2.1     05-26  Mg     2.0     05-26    TPro  5.9<L>  /  Alb  3.5  /  TBili  0.8  /  DBili  x   /  AST  22  /  ALT  12  /  AlkPhos  55  05-26    PT/INR - ( 26 May 2019 02:46 )   PT: 12.1 sec;   INR: 1.07     PTT - ( 26 May 2019 02:46 )  PTT:44.8 sec    ABG - ( 24 May 2019 16:56 ) 7.47/40/79/96    RADIOLOGY & ADDITIONAL STUDIES: poor quality film    patient with known mitral valve disease and SVT - symptomatic now s/p MV repair and CryoMAZE procedure.    1. CV  hemodynamically stable  sinus rhythm  ASA/statin  metoprolol 12.5 q6h  lasix 40 daily    bowel regimen last 5/25  glycemic control - /104    DVT and GI prophylaxis  d/c planning  awaiting bed availability on floor    d/w patient/ present in room; staff and CTS

## 2019-05-27 LAB
ALBUMIN SERPL ELPH-MCNC: 3.3 G/DL — SIGNIFICANT CHANGE UP (ref 3.3–5)
ALP SERPL-CCNC: 52 U/L — SIGNIFICANT CHANGE UP (ref 40–120)
ALT FLD-CCNC: 19 U/L — SIGNIFICANT CHANGE UP (ref 10–45)
ANION GAP SERPL CALC-SCNC: 8 MMOL/L — SIGNIFICANT CHANGE UP (ref 5–17)
APTT BLD: 56.2 SEC — HIGH (ref 27.5–36.3)
AST SERPL-CCNC: 26 U/L — SIGNIFICANT CHANGE UP (ref 10–40)
BILIRUB SERPL-MCNC: 0.8 MG/DL — SIGNIFICANT CHANGE UP (ref 0.2–1.2)
BUN SERPL-MCNC: 16 MG/DL — SIGNIFICANT CHANGE UP (ref 7–23)
CALCIUM SERPL-MCNC: 9 MG/DL — SIGNIFICANT CHANGE UP (ref 8.4–10.5)
CHLORIDE SERPL-SCNC: 101 MMOL/L — SIGNIFICANT CHANGE UP (ref 96–108)
CO2 SERPL-SCNC: 31 MMOL/L — SIGNIFICANT CHANGE UP (ref 22–31)
CREAT SERPL-MCNC: 0.51 MG/DL — SIGNIFICANT CHANGE UP (ref 0.5–1.3)
GLUCOSE SERPL-MCNC: 105 MG/DL — HIGH (ref 70–99)
HCT VFR BLD CALC: 27.4 % — LOW (ref 34.5–45)
HGB BLD-MCNC: 9.2 G/DL — LOW (ref 11.5–15.5)
INR BLD: 1.1 — SIGNIFICANT CHANGE UP (ref 0.88–1.16)
MAGNESIUM SERPL-MCNC: 1.8 MG/DL — SIGNIFICANT CHANGE UP (ref 1.6–2.6)
MCHC RBC-ENTMCNC: 32.9 PG — SIGNIFICANT CHANGE UP (ref 27–34)
MCHC RBC-ENTMCNC: 33.6 GM/DL — SIGNIFICANT CHANGE UP (ref 32–36)
MCV RBC AUTO: 97.9 FL — SIGNIFICANT CHANGE UP (ref 80–100)
NRBC # BLD: 0 /100 WBCS — SIGNIFICANT CHANGE UP (ref 0–0)
PHOSPHATE SERPL-MCNC: 3 MG/DL — SIGNIFICANT CHANGE UP (ref 2.5–4.5)
PLATELET # BLD AUTO: 162 K/UL — SIGNIFICANT CHANGE UP (ref 150–400)
POTASSIUM SERPL-MCNC: 4 MMOL/L — SIGNIFICANT CHANGE UP (ref 3.5–5.3)
POTASSIUM SERPL-SCNC: 4 MMOL/L — SIGNIFICANT CHANGE UP (ref 3.5–5.3)
PROT SERPL-MCNC: 5.9 G/DL — LOW (ref 6–8.3)
PROTHROM AB SERPL-ACNC: 12.5 SEC — SIGNIFICANT CHANGE UP (ref 10–12.9)
RBC # BLD: 2.8 M/UL — LOW (ref 3.8–5.2)
RBC # FLD: 14.1 % — SIGNIFICANT CHANGE UP (ref 10.3–14.5)
SODIUM SERPL-SCNC: 140 MMOL/L — SIGNIFICANT CHANGE UP (ref 135–145)
WBC # BLD: 8.07 K/UL — SIGNIFICANT CHANGE UP (ref 3.8–10.5)
WBC # FLD AUTO: 8.07 K/UL — SIGNIFICANT CHANGE UP (ref 3.8–10.5)

## 2019-05-27 PROCEDURE — 99233 SBSQ HOSP IP/OBS HIGH 50: CPT

## 2019-05-27 PROCEDURE — 76604 US EXAM CHEST: CPT | Mod: 26

## 2019-05-27 PROCEDURE — 71045 X-RAY EXAM CHEST 1 VIEW: CPT | Mod: 26

## 2019-05-27 RX ORDER — SODIUM CHLORIDE 9 MG/ML
3 INJECTION INTRAMUSCULAR; INTRAVENOUS; SUBCUTANEOUS EVERY 8 HOURS
Refills: 0 | Status: DISCONTINUED | OUTPATIENT
Start: 2019-05-27 | End: 2019-05-29

## 2019-05-27 RX ORDER — ACETAMINOPHEN 500 MG
650 TABLET ORAL EVERY 6 HOURS
Refills: 0 | Status: DISCONTINUED | OUTPATIENT
Start: 2019-05-27 | End: 2019-05-29

## 2019-05-27 RX ORDER — HEPARIN SODIUM 5000 [USP'U]/ML
5000 INJECTION INTRAVENOUS; SUBCUTANEOUS EVERY 12 HOURS
Refills: 0 | Status: DISCONTINUED | OUTPATIENT
Start: 2019-05-27 | End: 2019-05-29

## 2019-05-27 RX ADMIN — HEPARIN SODIUM 5000 UNIT(S): 5000 INJECTION INTRAVENOUS; SUBCUTANEOUS at 17:18

## 2019-05-27 RX ADMIN — SODIUM CHLORIDE 3 MILLILITER(S): 9 INJECTION INTRAMUSCULAR; INTRAVENOUS; SUBCUTANEOUS at 21:30

## 2019-05-27 RX ADMIN — Medication 12.5 MILLIGRAM(S): at 05:21

## 2019-05-27 RX ADMIN — CHLORHEXIDINE GLUCONATE 1 APPLICATION(S): 213 SOLUTION TOPICAL at 05:15

## 2019-05-27 RX ADMIN — Medication 81 MILLIGRAM(S): at 12:27

## 2019-05-27 RX ADMIN — Medication 12.5 MILLIGRAM(S): at 23:13

## 2019-05-27 RX ADMIN — SODIUM CHLORIDE 3 MILLILITER(S): 9 INJECTION INTRAMUSCULAR; INTRAVENOUS; SUBCUTANEOUS at 14:05

## 2019-05-27 RX ADMIN — PANTOPRAZOLE SODIUM 40 MILLIGRAM(S): 20 TABLET, DELAYED RELEASE ORAL at 07:03

## 2019-05-27 RX ADMIN — Medication 20 MILLIEQUIVALENT(S): at 12:27

## 2019-05-27 RX ADMIN — Medication 12.5 MILLIGRAM(S): at 12:27

## 2019-05-27 RX ADMIN — Medication 40 MILLIGRAM(S): at 05:21

## 2019-05-27 RX ADMIN — ATORVASTATIN CALCIUM 20 MILLIGRAM(S): 80 TABLET, FILM COATED ORAL at 21:03

## 2019-05-27 RX ADMIN — HEPARIN SODIUM 5000 UNIT(S): 5000 INJECTION INTRAVENOUS; SUBCUTANEOUS at 08:07

## 2019-05-27 NOTE — CHART NOTE - NSCHARTNOTEFT_GEN_A_CORE
Exam:  US Chest    Procedure Date: 5/27/19    History: 78y Female whose CXR today shows a possible right sided pleural effusion.  Pt is POD # 5 from mini MVR    Findings:                    Evaluation of the right side of the thoracic cavity demonstrates                   small  pleural effusion                                Lung is freely movable with in thoracic cavity    Impression:  small large pleural effusion  No intervention warranted.  Patient on room air, asymptomatic.

## 2019-05-27 NOTE — PROGRESS NOTE ADULT - SUBJECTIVE AND OBJECTIVE BOX
LIZZETTE SANCHEZ  MRN-[id number]    Patient is a 78y old  Female who presents with a chief complaint of Mitral Regurgitation (26 May 2019 15:24)      HPI:  77 y/o female with PMHx of HTN, s/p ablation at [facility] in 2018 for >17,000 PVC on holter monitor now presents for her known mitral valve regurgitation/prolapse. Patient reports worsening MO and exercise tolerance of 2 blocks. She had a + stress test in Jan 2018 and a LHC on 2/12/18 showing normal coronary arteries. Her last echo on 4/24 showed severe MR with prolapse of the posterior mitral leaflet. and severe eccentric MR, and mild TR, normal LV funciton and severe LA enlargement. the degree of MR had significantly worsened from echo's done in 2018 and 2017. She was seen by Dr. Kent in the office and was found to be a surgical candidate. She now presents for her completion of her preoperative workup including carotid duples, xray, non contrast chest CT. She states that she has been in her usual state of health. She states that she has some fatigue and MO after walking a few blocks. She denies any CP, palpitations, orthopnea, LE edema, fevers/chills, nausea/vomiting. (21 May 2019 16:43)      PAST MEDICAL & SURGICAL HISTORY:  Epilepsy  Macular degeneration  Arrhythmia  HTN (hypertension)  Former smoker  No significant past surgical history      FAMILY HISTORY:  No pertinent family history in first degree relatives      Social History:  Denies smoking, illicit drug use or frequent alcohol consumption    Allergies    No Known Allergies    Intolerances        MEDICATIONS  (STANDING):  aspirin enteric coated 81 milliGRAM(s) Oral daily  atorvastatin 20 milliGRAM(s) Oral at bedtime  chlorhexidine 2% Cloths 1 Application(s) Topical <User Schedule>  dextrose 50% Injectable 50 milliLiter(s) IV Push every 15 minutes  dextrose 50% Injectable 25 milliLiter(s) IV Push every 15 minutes  furosemide    Tablet 40 milliGRAM(s) Oral daily  heparin  Injectable 5000 Unit(s) SubCutaneous every 12 hours  metoprolol tartrate 12.5 milliGRAM(s) Oral every 6 hours  pantoprazole    Tablet 40 milliGRAM(s) Oral before breakfast  potassium chloride    Tablet ER 20 milliEquivalent(s) Oral daily  PreserVision Eye Vitamin tablet 2 Capsule(s) 2 Capsule(s) Oral daily  sodium chloride 0.9%. 1000 milliLiter(s) (10 mL/Hr) IV Continuous <Continuous>    MEDICATIONS  (PRN):  artificial  tears Solution 1 Drop(s) Both EYES every 3 hours PRN Dry Eyes      Review of Systems:  Constitutional:  Negative for weight change, fever, malaise  HEENT:  Negative for sinus pain, hoarseness, sore throat, dysphagia, vision changes  Cardiovascular:  Negative for chest pain, palpitations, dizziness  Respiratory:  Negative for cough, wheezing, dyspnea  Gastrointestinal:  Negative for nausea, vomiting, diarrhea, melena  Musculoskeletal:  Negative for pain, swelling, stiffness   Neuro:  Negative for weakness, numbness, headache  Psych:  Negative for anxiety, depression  Endocrine:  Negative for polyuria, polydipsia, temperature Intolerance    All other systems negative unless otherwise stated    ICU Vital Signs Last 24 Hrs  T(C): 36.9 (27 May 2019 05:01), Max: 36.9 (27 May 2019 05:01)  T(F): 98.4 (27 May 2019 05:01), Max: 98.4 (27 May 2019 05:01)  HR: 90 (27 May 2019 05:05) (80 - 90)  BP: 127/51 (27 May 2019 05:05) (100/53 - 127/55)  BP(mean): 86 (27 May 2019 05:05) (0 - 86)  ABP: --  ABP(mean): --  RR: 19 (27 May 2019 05:05) (16 - 29)  SpO2: 95% (27 May 2019 05:05) (94% - 100%)    Daily     Daily   I&O's Summary    26 May 2019 07:01  -  27 May 2019 07:00  --------------------------------------------------------  IN: 510 mL / OUT: 2150 mL / NET: -1640 mL        Physical Exam:  Gen: Alert, no apparent distress  CV: Regular rate and rhythm no murmurs, rubs or gallops  Pulm: Clear to auscultation bilaterally, no rales, rhonchi or wheezes  Chest: Chest tubes/drains in place with dressings clean, dry and intact  GI: Abd is soft, non-tender and non-distended with +BS  Ext: No clubbing, cyanosis or edema  Neuro: A+Ox3, follows commands and moves all extremities    Labs:                          9.2    8.07  )-----------( 162      ( 27 May 2019 05:14 )             27.4       05-27    140  |  101  |  16  ----------------------------<  105<H>  4.0   |  31  |  0.51    Ca    9.0      27 May 2019 01:32  Phos  3.0     05-27  Mg     1.8     05-27    TPro  5.9<L>  /  Alb  3.3  /  TBili  0.8  /  DBili  x   /  AST  26  /  ALT  19  /  AlkPhos  52  05-27          PT/INR - ( 27 May 2019 01:32 )   PT: 12.5 sec;   INR: 1.10          PTT - ( 27 May 2019 01:32 )  PTT:56.2 sec    Assessment/Plan:    Neuro:   Pain control with Tylenol                                          Cardiovascular:    Stable hemodynamics  Not on any pressors  C/w ASA, Lipitor, Lopressor  Continue hemodynamic monitoring    Respiratory:  Pt is comfortable on nasal cannula  Encourage incentive spirometry    GI:  Tolerating DASH diet  Continue bowel regimen, Protonix  Continue Zofran for nausea - PRN	          Renal:  Diuresing well with PO Lasix - 1.6L negative and still volume up on exam - will continue  Monitor I/Os and electrolytes    Hematologic / Oncology:  No signs of active bleeding, H/H stable  HSQ for DVT ppl    Infectious disease:  All surgical incision / prior chest tube sites look clean  No fever or other signs of infection     Endocrine:  Continue Accuchecks with coverage    All clinical, lab, hemodynamic and radiographic data were reviewed and the plan was discussed with CTICU team.     Bruno Guerin MD LIZZETTE SANCHEZ  MRN-[id number]    Patient is a 78y old  Female who presents with a chief complaint of Mitral Regurgitation (26 May 2019 15:24)    HPI:  77 y/o female with PMHx of HTN, s/p ablation at [facility] in 2018 for >17,000 PVC on holter monitor, severe mitral valve regurgitation/prolapse is s/p minimally invasive mitral valve repair. Per history the patient reported worsening MO and exercise tolerance of 2 blocks. She had a + stress test in Jan 2018 and a LHC on 2/12/18 showing normal coronary arteries. Her last echo on 4/24 showed severe MR with prolapse of the posterior mitral leaflet and severe eccentric MR, and mild TR, normal LV funciton and severe LA enlargement. The degree of MR had significantly worsened from echo's done in 2018 and 2017. She was seen by Dr. Kent in the office and was found to be a surgical candidate. She is now s/p minimally invasive mitral valve repair. She has no complaints.     PAST MEDICAL & SURGICAL HISTORY:  Epilepsy  Macular degeneration  Arrhythmia  HTN (hypertension)  Former smoker  No significant past surgical history    FAMILY HISTORY:  No pertinent family history in first degree relatives    Social History:  Denies illicit drug use or frequent alcohol consumption. Endorses prior smoking history.     Allergies  No Known Allergies    MEDICATIONS  (STANDING):  aspirin enteric coated 81 milliGRAM(s) Oral daily  atorvastatin 20 milliGRAM(s) Oral at bedtime  chlorhexidine 2% Cloths 1 Application(s) Topical <User Schedule>  dextrose 50% Injectable 50 milliLiter(s) IV Push every 15 minutes  dextrose 50% Injectable 25 milliLiter(s) IV Push every 15 minutes  furosemide    Tablet 40 milliGRAM(s) Oral daily  heparin  Injectable 5000 Unit(s) SubCutaneous every 12 hours  metoprolol tartrate 12.5 milliGRAM(s) Oral every 6 hours  pantoprazole    Tablet 40 milliGRAM(s) Oral before breakfast  potassium chloride    Tablet ER 20 milliEquivalent(s) Oral daily  PreserVision Eye Vitamin tablet 2 Capsule(s) 2 Capsule(s) Oral daily  sodium chloride 0.9%. 1000 milliLiter(s) (10 mL/Hr) IV Continuous <Continuous>    MEDICATIONS  (PRN):  artificial  tears Solution 1 Drop(s) Both EYES every 3 hours PRN Dry Eyes    Review of Systems:  Constitutional:  Negative for weight change, fever, malaise  HEENT:  Negative for sinus pain, hoarseness, sore throat, dysphagia, vision changes  Cardiovascular:  Negative for chest pain, palpitations, dizziness  Respiratory:  Negative for cough, wheezing, dyspnea  Gastrointestinal:  Negative for nausea, vomiting, diarrhea, melena  Musculoskeletal:  Negative for pain, swelling, stiffness   Neuro:  Negative for weakness, numbness, headache  Psych:  Negative for anxiety, depression  Endocrine:  Negative for polyuria, polydipsia, temperature Intolerance    All other systems negative unless otherwise stated    ICU Vital Signs Last 24 Hrs  T(C): 36.9 (27 May 2019 05:01), Max: 36.9 (27 May 2019 05:01)  T(F): 98.4 (27 May 2019 05:01), Max: 98.4 (27 May 2019 05:01)  HR: 90 (27 May 2019 05:05) (80 - 90)  BP: 127/51 (27 May 2019 05:05) (100/53 - 127/55)  BP(mean): 86 (27 May 2019 05:05) (0 - 86)  ABP: --  ABP(mean): --  RR: 19 (27 May 2019 05:05) (16 - 29)  SpO2: 95% (27 May 2019 05:05) (94% - 100%)    Daily     Daily   I&O's Summary    26 May 2019 07:01  -  27 May 2019 07:00  --------------------------------------------------------  IN: 510 mL / OUT: 2150 mL / NET: -1640 mL    Physical Exam:  Gen: Alert, no apparent distress  CV: Regular rate and rhythm no murmurs, rubs or gallops  Pulm: Diffuse rhonchi  GI: Abd is soft, non-tender and non-distended with +BS  Ext: No clubbing, cyanosis; trace bilateral lower extremity edema  Neuro: A+Ox3, follows commands and moves all extremities    Labs: 77 y/o female with PMHx of HTN, s/p ablation at [facility] in 2018 for >17,000 PVC on holter monitor, severe mitral valve regurgitation/prolapse is s/p minimally invasive mitral valve repair.                          9.2    8.07  )-----------( 162      ( 27 May 2019 05:14 )             27.4       05-27    140  |  101  |  16  ----------------------------<  105<H>  4.0   |  31  |  0.51    Ca    9.0      27 May 2019 01:32  Phos  3.0     05-27  Mg     1.8     05-27    TPro  5.9<L>  /  Alb  3.3  /  TBili  0.8  /  DBili  x   /  AST  26  /  ALT  19  /  AlkPhos  52  05-27  PT/INR - ( 27 May 2019 01:32 )   PT: 12.5 sec;   INR: 1.10     PTT - ( 27 May 2019 01:32 )  PTT:56.2 sec    Assessment/Plan:    Neuro:   Pain control with Tylenol                                          Cardiovascular:    Stable hemodynamics  Not on any pressors  C/w ASA, Lipitor, Lopressor  Continue hemodynamic monitoring    Respiratory:  Pt is comfortable on nasal cannula  Encourage incentive spirometry    GI:  Tolerating DASH diet  Continue bowel regimen, Protonix  Continue Zofran for nausea - PRN	          Renal:  Diuresing well with PO Lasix - 1.6L negative and still volume up on exam - will continue  Monitor I/Os and electrolytes    Hematologic / Oncology:  No signs of active bleeding, H/H stable  HSQ for DVT ppl    Infectious disease:  All surgical incision / prior chest tube sites look clean  No fever or other signs of infection     Endocrine:  Continue Accuchecks with coverage    All clinical, lab, hemodynamic and radiographic data were reviewed and the plan was discussed with CTICU team.     Bruno Guerin MD

## 2019-05-27 NOTE — PROGRESS NOTE ADULT - SUBJECTIVE AND OBJECTIVE BOX
Patient discussed on morning rounds with Dr. Martínez    Operation / Date: 5/22/19 mini V Repair, cryomaze, LESLIE exclusion    SUBJECTIVE ASSESSMENT:  Patient feels well.  States she is ambulating in the hallway w/o issues, and eating PO diet w/o issues.  She denies SOB, chest pain, dizziness, N/V/D.  She is breathing on room air, and trying her best to use her IS machine.  Her  is encouraging her as well.  She had a BM this morning.  No pain at this time.     Vital Signs Last 24 Hrs  T(C): 36.9 (27 May 2019 14:00), Max: 37 (27 May 2019 09:00)  T(F): 98.4 (27 May 2019 14:00), Max: 98.6 (27 May 2019 09:00)  HR: 92 (27 May 2019 12:25) (80 - 92)  BP: 103/47 (27 May 2019 12:25) (102/40 - 127/55)  BP(mean): 71 (27 May 2019 12:25) (68 - 86)  RR: 28 (27 May 2019 12:25) (16 - 28)  SpO2: 95% (27 May 2019 12:25) (94% - 100%)  I&O's Detail    26 May 2019 07:01  -  27 May 2019 07:00  --------------------------------------------------------  IN:    Oral Fluid: 510 mL  Total IN: 510 mL    OUT:    Voided: 2150 mL  Total OUT: 2150 mL    Total NET: -1640 mL      27 May 2019 07:01  -  27 May 2019 15:36  --------------------------------------------------------  IN:  Total IN: 0 mL    OUT:    Voided: 250 mL  Total OUT: 250 mL    Total NET: -250 mL      CHEST TUBE: No   ADDIE DRAIN: No  EPICARDIAL WIRES: No  TIE DOWNS: Yes, 2 on right lateral chest.   FITZPATRICK: No    PHYSICAL EXAM:    GEN: NAD, looks comfortable.  Pleasant   Psych: Mood appropriate  Neuro: A&Ox3.  No focal deficits.  Moving all extremities.   HEENT: No obvious abnormalities  CV: S1S2, regular, no murmurs appreciated.  No carotid bruits.  No JVD  Lungs: Decreased at bases B/L R>L.  No wheezing, rales or rhonchi  ABD: Soft, non-tender, non-distended.  +Bowel sounds  EXT: Warm and well perfused.  No peripheral edema noted  Musculoskeletal: Moving all extremities with normal ROM, no joint swelling  PV: Pedal pulses palpable  Incision Sites: Right thoracotomy healing w/o drainage.  +ecchymosis.      LABS:                        9.2    8.07  )-----------( 162      ( 27 May 2019 05:14 )             27.4       COUMADIN:  No    PT/INR - ( 27 May 2019 01:32 )   PT: 12.5 sec;   INR: 1.10          PTT - ( 27 May 2019 01:32 )  PTT:56.2 sec    05-27    140  |  101  |  16  ----------------------------<  105<H>  4.0   |  31  |  0.51    Ca    9.0      27 May 2019 01:32  Phos  3.0     05-27  Mg     1.8     05-27    TPro  5.9<L>  /  Alb  3.3  /  TBili  0.8  /  DBili  x   /  AST  26  /  ALT  19  /  AlkPhos  52  05-27          MEDICATIONS  (STANDING):  aspirin enteric coated 81 milliGRAM(s) Oral daily  atorvastatin 20 milliGRAM(s) Oral at bedtime  chlorhexidine 2% Cloths 1 Application(s) Topical <User Schedule>  furosemide    Tablet 40 milliGRAM(s) Oral daily  heparin  Injectable 5000 Unit(s) SubCutaneous every 12 hours  metoprolol tartrate 12.5 milliGRAM(s) Oral every 6 hours  pantoprazole    Tablet 40 milliGRAM(s) Oral before breakfast  potassium chloride    Tablet ER 20 milliEquivalent(s) Oral daily  PreserVision Eye Vitamin tablet 2 Capsule(s) 2 Capsule(s) Oral daily  sodium chloride 0.9% lock flush 3 milliLiter(s) IV Push every 8 hours    MEDICATIONS  (PRN):  artificial  tears Solution 1 Drop(s) Both EYES every 3 hours PRN Dry Eyes        RADIOLOGY & ADDITIONAL TESTS:    < from: Xray Chest 1 View- PORTABLE-Routine (05.27.19 @ 03:49) >    Portable examination of the chest demonstrates cardiomegaly. Congestion   and/or infiltrates. Bilateral effusions. Calcification aortic knob. Mild   dextro scoliosis thoracic spine.    Impression: Congestion and/or infiltrates. Bilateral effusions    < end of copied text >

## 2019-05-28 ENCOUNTER — TRANSCRIPTION ENCOUNTER (OUTPATIENT)
Age: 79
End: 2019-05-28

## 2019-05-28 LAB
ANION GAP SERPL CALC-SCNC: 9 MMOL/L — SIGNIFICANT CHANGE UP (ref 5–17)
BUN SERPL-MCNC: 16 MG/DL — SIGNIFICANT CHANGE UP (ref 7–23)
CALCIUM SERPL-MCNC: 9.1 MG/DL — SIGNIFICANT CHANGE UP (ref 8.4–10.5)
CHLORIDE SERPL-SCNC: 102 MMOL/L — SIGNIFICANT CHANGE UP (ref 96–108)
CO2 SERPL-SCNC: 29 MMOL/L — SIGNIFICANT CHANGE UP (ref 22–31)
CREAT SERPL-MCNC: 0.58 MG/DL — SIGNIFICANT CHANGE UP (ref 0.5–1.3)
GLUCOSE SERPL-MCNC: 105 MG/DL — HIGH (ref 70–99)
HCT VFR BLD CALC: 30.7 % — LOW (ref 34.5–45)
HGB BLD-MCNC: 9.9 G/DL — LOW (ref 11.5–15.5)
MAGNESIUM SERPL-MCNC: 1.9 MG/DL — SIGNIFICANT CHANGE UP (ref 1.6–2.6)
MCHC RBC-ENTMCNC: 32.2 GM/DL — SIGNIFICANT CHANGE UP (ref 32–36)
MCHC RBC-ENTMCNC: 32.7 PG — SIGNIFICANT CHANGE UP (ref 27–34)
MCV RBC AUTO: 101.3 FL — HIGH (ref 80–100)
NRBC # BLD: 0 /100 WBCS — SIGNIFICANT CHANGE UP (ref 0–0)
PLATELET # BLD AUTO: 218 K/UL — SIGNIFICANT CHANGE UP (ref 150–400)
POTASSIUM SERPL-MCNC: 4.2 MMOL/L — SIGNIFICANT CHANGE UP (ref 3.5–5.3)
POTASSIUM SERPL-SCNC: 4.2 MMOL/L — SIGNIFICANT CHANGE UP (ref 3.5–5.3)
RBC # BLD: 3.03 M/UL — LOW (ref 3.8–5.2)
RBC # FLD: 14 % — SIGNIFICANT CHANGE UP (ref 10.3–14.5)
SODIUM SERPL-SCNC: 140 MMOL/L — SIGNIFICANT CHANGE UP (ref 135–145)
WBC # BLD: 7.72 K/UL — SIGNIFICANT CHANGE UP (ref 3.8–10.5)
WBC # FLD AUTO: 7.72 K/UL — SIGNIFICANT CHANGE UP (ref 3.8–10.5)

## 2019-05-28 PROCEDURE — 93010 ELECTROCARDIOGRAM REPORT: CPT

## 2019-05-28 PROCEDURE — 93306 TTE W/DOPPLER COMPLETE: CPT | Mod: 26

## 2019-05-28 PROCEDURE — 71046 X-RAY EXAM CHEST 2 VIEWS: CPT | Mod: 26

## 2019-05-28 PROCEDURE — 76604 US EXAM CHEST: CPT | Mod: 26

## 2019-05-28 RX ORDER — METOPROLOL TARTRATE 50 MG
12.5 TABLET ORAL ONCE
Refills: 0 | Status: COMPLETED | OUTPATIENT
Start: 2019-05-28 | End: 2019-05-28

## 2019-05-28 RX ORDER — ACETAMINOPHEN 500 MG
2 TABLET ORAL
Qty: 240 | Refills: 0
Start: 2019-05-28 | End: 2019-06-26

## 2019-05-28 RX ORDER — METOPROLOL TARTRATE 50 MG
1.5 TABLET ORAL
Qty: 0 | Refills: 0 | DISCHARGE

## 2019-05-28 RX ORDER — METOPROLOL TARTRATE 50 MG
1 TABLET ORAL
Qty: 60 | Refills: 0
Start: 2019-05-28 | End: 2019-06-26

## 2019-05-28 RX ORDER — METOPROLOL TARTRATE 50 MG
25 TABLET ORAL EVERY 12 HOURS
Refills: 0 | Status: DISCONTINUED | OUTPATIENT
Start: 2019-05-28 | End: 2019-05-29

## 2019-05-28 RX ORDER — ATORVASTATIN CALCIUM 80 MG/1
1 TABLET, FILM COATED ORAL
Qty: 30 | Refills: 0
Start: 2019-05-28 | End: 2019-06-26

## 2019-05-28 RX ORDER — ASPIRIN/CALCIUM CARB/MAGNESIUM 324 MG
1 TABLET ORAL
Qty: 30 | Refills: 0
Start: 2019-05-28 | End: 2019-06-26

## 2019-05-28 RX ORDER — MULTIVIT-MIN/FERROUS GLUCONATE 9 MG/15 ML
1 LIQUID (ML) ORAL
Qty: 60 | Refills: 0
Start: 2019-05-28 | End: 2019-06-26

## 2019-05-28 RX ORDER — POTASSIUM CHLORIDE 20 MEQ
1 PACKET (EA) ORAL
Qty: 14 | Refills: 0
Start: 2019-05-28 | End: 2019-06-10

## 2019-05-28 RX ORDER — ALBUMIN HUMAN 25 %
250 VIAL (ML) INTRAVENOUS ONCE
Refills: 0 | Status: COMPLETED | OUTPATIENT
Start: 2019-05-28 | End: 2019-05-28

## 2019-05-28 RX ORDER — MAGNESIUM OXIDE 400 MG ORAL TABLET 241.3 MG
800 TABLET ORAL ONCE
Refills: 0 | Status: COMPLETED | OUTPATIENT
Start: 2019-05-28 | End: 2019-05-28

## 2019-05-28 RX ORDER — FUROSEMIDE 40 MG
1 TABLET ORAL
Qty: 14 | Refills: 0
Start: 2019-05-28 | End: 2019-06-10

## 2019-05-28 RX ADMIN — HEPARIN SODIUM 5000 UNIT(S): 5000 INJECTION INTRAVENOUS; SUBCUTANEOUS at 20:00

## 2019-05-28 RX ADMIN — Medication 81 MILLIGRAM(S): at 13:04

## 2019-05-28 RX ADMIN — Medication 40 MILLIGRAM(S): at 05:53

## 2019-05-28 RX ADMIN — CHLORHEXIDINE GLUCONATE 1 APPLICATION(S): 213 SOLUTION TOPICAL at 05:52

## 2019-05-28 RX ADMIN — SODIUM CHLORIDE 3 MILLILITER(S): 9 INJECTION INTRAMUSCULAR; INTRAVENOUS; SUBCUTANEOUS at 15:00

## 2019-05-28 RX ADMIN — PANTOPRAZOLE SODIUM 40 MILLIGRAM(S): 20 TABLET, DELAYED RELEASE ORAL at 07:23

## 2019-05-28 RX ADMIN — MAGNESIUM OXIDE 400 MG ORAL TABLET 800 MILLIGRAM(S): 241.3 TABLET ORAL at 10:02

## 2019-05-28 RX ADMIN — Medication 125 MILLILITER(S): at 17:00

## 2019-05-28 RX ADMIN — SODIUM CHLORIDE 3 MILLILITER(S): 9 INJECTION INTRAMUSCULAR; INTRAVENOUS; SUBCUTANEOUS at 21:30

## 2019-05-28 RX ADMIN — ATORVASTATIN CALCIUM 20 MILLIGRAM(S): 80 TABLET, FILM COATED ORAL at 21:32

## 2019-05-28 RX ADMIN — Medication 12.5 MILLIGRAM(S): at 07:23

## 2019-05-28 RX ADMIN — Medication 12.5 MILLIGRAM(S): at 13:54

## 2019-05-28 RX ADMIN — SODIUM CHLORIDE 3 MILLILITER(S): 9 INJECTION INTRAMUSCULAR; INTRAVENOUS; SUBCUTANEOUS at 05:51

## 2019-05-28 RX ADMIN — Medication 20 MILLIEQUIVALENT(S): at 13:04

## 2019-05-28 RX ADMIN — HEPARIN SODIUM 5000 UNIT(S): 5000 INJECTION INTRAVENOUS; SUBCUTANEOUS at 05:53

## 2019-05-28 RX ADMIN — Medication 25 MILLIGRAM(S): at 20:47

## 2019-05-28 NOTE — PROGRESS NOTE ADULT - SUBJECTIVE AND OBJECTIVE BOX
Patient discussed on morning rounds with Dr. Kent    Operation / Date: 5/22 mini MV repair    SUBJECTIVE ASSESSMENT:  Feels well, anxious to go home.  Denies HA, dizziness, CP, SOB, palpitations, N/V, leg swelling or calf pain.     Vital Signs Last 24 Hrs  T(C): 36.4 (28 May 2019 13:16), Max: 37 (27 May 2019 21:11)  T(F): 97.6 (28 May 2019 13:16), Max: 98.6 (27 May 2019 21:11)  HR: 95 (28 May 2019 09:45) (86 - 98)  BP: 92/51 (28 May 2019 09:45) (92/51 - 119/57)  BP(mean): 67 (28 May 2019 09:45) (66 - 81)  RR: 16 (28 May 2019 09:45) (13 - 16)  SpO2: 97% (28 May 2019 09:45) (94% - 97%)  I&O's Detail    27 May 2019 07:01  -  28 May 2019 07:00  --------------------------------------------------------  IN:    Oral Fluid: 220 mL  Total IN: 220 mL    OUT:    Voided: 900 mL  Total OUT: 900 mL    Total NET: -680 mL      28 May 2019 07:01  -  28 May 2019 17:23  --------------------------------------------------------  IN:    Oral Fluid: 300 mL  Total IN: 300 mL    OUT:  Total OUT: 0 mL    Total NET: 300 mL    CHEST TUBE:  No.  ADDIE DRAIN:  No.  EPICARDIAL WIRES: No.  TIE DOWNS: No.  FITZPATRICK: No.    PHYSICAL EXAM:    General: OOB in chair, comfortable, NAD    Neurological: A&O x 3 ORTEZ, no focal deficits    Cardiovascular: S1S2 RRR No M/G/R    Respiratory: CTA b/l No W/R/R    Gastrointestinal: soft, NT/ND    Extremities: No edema b/l    Vascular: warm and well perfused    Incision Sites: right mini thoracotomy incision healing well, no drainage.     LABS:                        9.9    7.72  )-----------( 218      ( 28 May 2019 06:20 )             30.7       PT/INR - ( 27 May 2019 01:32 )   PT: 12.5 sec;   INR: 1.10          PTT - ( 27 May 2019 01:32 )  PTT:56.2 sec    05-28    140  |  102  |  16  ----------------------------<  105<H>  4.2   |  29  |  0.58    Ca    9.1      28 May 2019 06:20  Phos  3.0     05-27  Mg     1.9     05-28    TPro  5.9<L>  /  Alb  3.3  /  TBili  0.8  /  DBili  x   /  AST  26  /  ALT  19  /  AlkPhos  52  05-27    MEDICATIONS  (STANDING):  albumin human  5% IVPB 250 milliLiter(s) IV Intermittent once  aspirin enteric coated 81 milliGRAM(s) Oral daily  atorvastatin 20 milliGRAM(s) Oral at bedtime  chlorhexidine 2% Cloths 1 Application(s) Topical <User Schedule>  furosemide    Tablet 40 milliGRAM(s) Oral daily  heparin  Injectable 5000 Unit(s) SubCutaneous every 12 hours  metoprolol tartrate 25 milliGRAM(s) Oral every 12 hours  pantoprazole    Tablet 40 milliGRAM(s) Oral before breakfast  potassium chloride    Tablet ER 20 milliEquivalent(s) Oral daily  PreserVision Eye Vitamin tablet 2 Capsule(s) 2 Capsule(s) Oral daily  sodium chloride 0.9% lock flush 3 milliLiter(s) IV Push every 8 hours    MEDICATIONS  (PRN):  acetaminophen   Tablet .. 650 milliGRAM(s) Oral every 6 hours PRN Mild Pain (1 - 3)  artificial  tears Solution 1 Drop(s) Both EYES every 3 hours PRN Dry Eyes        RADIOLOGY & ADDITIONAL TESTS:

## 2019-05-28 NOTE — DISCHARGE NOTE NURSING/CASE MANAGEMENT/SOCIAL WORK - NSDCDPATPORTLINK_GEN_ALL_CORE
You can access the [facility] Patient Portal, offered by [facility], by registering with the following website: [email or web address]

## 2019-05-28 NOTE — DISCHARGE NOTE PROVIDER - CARE PROVIDERS DIRECT ADDRESSES
[email or web address]

## 2019-05-28 NOTE — DISCHARGE NOTE NURSING/CASE MANAGEMENT/SOCIAL WORK - NSDCFUADDAPPT_GEN_ALL_CORE_FT
-Please follow up with Dr. Kent on 6/11/19 at 11:15 am.  The office is located at [facility], [facility], 4th floor. Call us with any questions #[phone].    -Please follow up with your cardiologist, Dr. Pascual Hernandez, on 6/7/19 at 9:45 am.

## 2019-05-28 NOTE — DISCHARGE NOTE NURSING/CASE MANAGEMENT/SOCIAL WORK - NSDCPEEMAIL_GEN_ALL_CORE
[facility] for Tobacco Control email [email or web address]

## 2019-05-28 NOTE — DISCHARGE NOTE PROVIDER - NSDCCPTREATMENT_GEN_ALL_CORE_FT
PRINCIPAL PROCEDURE  Procedure: Repair, mitral valve, with MANUEL  Findings and Treatment: Mitral valve repair, cryomaze, Clipping of left atrial appendage, EF nml

## 2019-05-28 NOTE — DISCHARGE NOTE PROVIDER - NSDCFUADDAPPT_GEN_ALL_CORE_FT
-Please follow up with Dr. Kent on 6/11/19 at 11:15 am.  The office is located at [facility], [facility], 4th floor. Call us with any questions #[phone].    -Please follow up with your cardiologist, Dr. Pascual Hernandez, on 6/7/19 at 9:45 am. -Please follow up with Dr. Kent on 6/4/19 at 11:00 am.  The office is located at [facility], [facility], 4th floor. Call us with any questions #[phone].    -Please follow up with your cardiologist, Dr. Pascual Hernandez, on 6/7/19 at 9:45 am. -Please follow up with Dr. Kent on 6/4/19 at 11:00 am.  The office is located at [facility], [facility], 4th floor. Call us with any questions #[phone].  -Please follow up with your cardiologist, Dr. Pascual Hernandez, on 6/7/19 at 9:45 am.

## 2019-05-28 NOTE — DISCHARGE NOTE PROVIDER - HOSPITAL COURSE
This is a 79 y/o female with PMHx of HTN, s/p ablation at [facility] in 2018 for PVCs found on holter monitor, who presented out-patient for known mitral valve regurgitation/prolapse, and was admitting to MO.  She was found to have severe MR. On 5/22/19 she underwent mini thoracotomy MV Repair, cryomaze and LESLIE exclusion.  Did not require any vasopressors/inotropes and she was extubated POD #0.  She was ultimately de-lined and her surgical CT was removed on POD #1.  She developed a right pleural effusion on POD #2 and had pigtail placed draining ~600cc.  She was transferred to floor care on POD #5.  She has been ambulating on room air and eating well w/o issues.  Today, on POD #6 PA/lat revealed reaccumulation of a small right sided pleural effusion.  Effusion was confirmed on bedside US, measuring < 200 cc, and she was started on lasix.  She is now clinically stable to be discharged home on diuretics for her effusion and will follow up with Dr. Kent for a repeat CXR.          35 minutes was spent with the patient reviewing the discharge material including medications, follow up appointments, recovery, concerning symptoms, and how to contact their health care providers if they have questions This is a 79 y/o female with PMHx of HTN, s/p ablation at [facility] in 2018 for PVCs found on holter monitor, who presented out-patient for known mitral valve regurgitation/prolapse, and was admitting to MO.  She was found to have severe MR. On 5/22/19 she underwent mini thoracotomy MV Repair, cryomaze and LESLIE exclusion.  Did not require any vasopressors/inotropes and she was extubated POD #0.  She was ultimately de-lined and her surgical CT was removed on POD #1.  She developed a right pleural effusion on POD #2 and had pigtail placed draining ~600cc.  She continued to improve, delined POD3. She was transferred to floor care on POD #5.  She has been ambulating on room air and eating well w/o issues. POD #6 PA/lat revealed reaccumulation of a small right sided pleural effusion.  Effusion was confirmed on bedside US, measuring < 200 cc, and she was started on lasix. D/c was cancelled 2/2 sinus tachycardia, echo revealed no pericardial effusion. Today POD7, patient offers no acute complaints. She is ambulating on RA, using IS pulling 1000cc, tolerating PO diet, +BMs. AM EKG reviewed with Dr. Hunter confirmed Sinus tachycardia w/ 1st degree AV block. BB unable to be titrated  2/2 soft pressures.  Per Dr. Kent patient is ready for discharge on Metoprolol 25mg BID, asa, no AC, and lasix.          35 minutes was spent with the patient reviewing the discharge material including medications, follow up appointments, recovery, concerning symptoms, and how to contact their health care providers if they have questions. This is a 79 y/o female with PMHx of HTN, s/p ablation at [facility] in 2018 for PVCs found on holter monitor, who presented out-patient for known mitral valve regurgitation/prolapse, and was admitting to MO.  She was found to have severe MR. On 5/22/19 she underwent mini thoracotomy MV Repair, cryomaze and LESLIE exclusion.  Did not require any vasopressors/inotropes and she was extubated POD #0.  She was ultimately de-lined and her surgical CT was removed on POD #1.  She developed a right pleural effusion on POD #2 and had pigtail placed draining ~600cc.  She continued to improve, delined POD3. She was transferred to floor care on POD #5.  She has been ambulating on room air and eating well w/o issues. POD #6 PA/lat revealed reaccumulation of a small right sided pleural effusion.  Effusion was confirmed on bedside US, measuring < 200 cc, and she was started on lasix. D/c was cancelled 2/2 sinus tachycardia, echo revealed no pericardial effusion. Today POD7, patient offers no acute complaints. She is ambulating on RA, using IS pulling 1000cc, tolerating PO diet, +BMs. AM EKG reviewed with Dr. Hunter confirmed Sinus tachycardia w/ 1st degree AV block. BB unable to be titrated  2/2 soft pressures.  Per Dr. Kent patient is ready for discharge on Metoprolol 25mg BID, asa, no AC, and lasix.          35 minutes was spent with the patient reviewing the discharge material including medications, follow up appointments, recovery, concerning symptoms, and how to contact their health care providers if they have questions.

## 2019-05-28 NOTE — DISCHARGE NOTE NURSING/CASE MANAGEMENT/SOCIAL WORK - NSDCPEWEB_GEN_ALL_CORE
[facility] for Tobacco Control website --- [email or web address] --- [email or web address]

## 2019-05-28 NOTE — DISCHARGE NOTE PROVIDER - NSDCFUADDINST_GEN_ALL_CORE_FT
-Walk daily as tolerated and use your incentive spirometer every hour.    -No driving or strenuous activity/exercise for 6 weeks, or until cleared by your surgeon.    -Gently clean your incisions with anti-bacterial soap and water, pat dry.  You may leave them open to air.    -Call your doctor if you have shortness of breath, chest pain not relieved by pain medication, dizziness, fever >101.5, or increased redness or drainage from incisions. -Walk daily as tolerated and use your incentive spirometer every hour.    -No driving or strenuous activity/exercise for 6 weeks, or until cleared by your surgeon.    -You may shower.  Be sure to gently clean your incisions with anti-bacterial soap and water daily, pat dry.  You may leave them open to air.    -Call your doctor if you have shortness of breath, chest pain not relieved by pain medication, dizziness, fever >101.5, or increased redness or drainage from incisions.

## 2019-05-28 NOTE — CHART NOTE - NSCHARTNOTEFT_GEN_A_CORE
Exam:  US Chest    Procedure Date:    History: 78y Female whose CXR today shows a possible right sided pleural effusion.  Pt is POD #6 from mitral valve repair.       Findings:                    Evaluation of the rigth side of the thoracic cavity demonstrates                   small pleural effusion               Impression:  small  pleural effusion

## 2019-05-28 NOTE — DISCHARGE NOTE PROVIDER - CARE PROVIDER_API CALL
Joao Kent (MD)  Cardiovascular Surgery  [address], [address]th [place] [address]  Phone: [phone]  Fax: [phone]  Follow Up Time:     Pascual Hernandez)  Cardiovascular Disease; Internal Medicine  [address] [place] [address]  Phone: [phone]  Fax: [phone]  Follow Up Time:

## 2019-05-29 VITALS — SYSTOLIC BLOOD PRESSURE: 102 MMHG | HEART RATE: 98 BPM | DIASTOLIC BLOOD PRESSURE: 51 MMHG | RESPIRATION RATE: 18 BRPM

## 2019-05-29 LAB
ANION GAP SERPL CALC-SCNC: 11 MMOL/L — SIGNIFICANT CHANGE UP (ref 5–17)
BUN SERPL-MCNC: 20 MG/DL — SIGNIFICANT CHANGE UP (ref 7–23)
CALCIUM SERPL-MCNC: 9.3 MG/DL — SIGNIFICANT CHANGE UP (ref 8.4–10.5)
CHLORIDE SERPL-SCNC: 103 MMOL/L — SIGNIFICANT CHANGE UP (ref 96–108)
CO2 SERPL-SCNC: 28 MMOL/L — SIGNIFICANT CHANGE UP (ref 22–31)
CREAT SERPL-MCNC: 0.54 MG/DL — SIGNIFICANT CHANGE UP (ref 0.5–1.3)
GLUCOSE SERPL-MCNC: 95 MG/DL — SIGNIFICANT CHANGE UP (ref 70–99)
HCT VFR BLD CALC: 31.1 % — LOW (ref 34.5–45)
HGB BLD-MCNC: 10.1 G/DL — LOW (ref 11.5–15.5)
MAGNESIUM SERPL-MCNC: 2.1 MG/DL — SIGNIFICANT CHANGE UP (ref 1.6–2.6)
MCHC RBC-ENTMCNC: 32.5 GM/DL — SIGNIFICANT CHANGE UP (ref 32–36)
MCHC RBC-ENTMCNC: 32.7 PG — SIGNIFICANT CHANGE UP (ref 27–34)
MCV RBC AUTO: 100.6 FL — HIGH (ref 80–100)
NRBC # BLD: 0 /100 WBCS — SIGNIFICANT CHANGE UP (ref 0–0)
PHOSPHATE SERPL-MCNC: 3.6 MG/DL — SIGNIFICANT CHANGE UP (ref 2.5–4.5)
PLATELET # BLD AUTO: 276 K/UL — SIGNIFICANT CHANGE UP (ref 150–400)
POTASSIUM SERPL-MCNC: 4.2 MMOL/L — SIGNIFICANT CHANGE UP (ref 3.5–5.3)
POTASSIUM SERPL-SCNC: 4.2 MMOL/L — SIGNIFICANT CHANGE UP (ref 3.5–5.3)
RBC # BLD: 3.09 M/UL — LOW (ref 3.8–5.2)
RBC # FLD: 13.9 % — SIGNIFICANT CHANGE UP (ref 10.3–14.5)
SODIUM SERPL-SCNC: 142 MMOL/L — SIGNIFICANT CHANGE UP (ref 135–145)
WBC # BLD: 8.78 K/UL — SIGNIFICANT CHANGE UP (ref 3.8–10.5)
WBC # FLD AUTO: 8.78 K/UL — SIGNIFICANT CHANGE UP (ref 3.8–10.5)

## 2019-05-29 PROCEDURE — P9016: CPT

## 2019-05-29 PROCEDURE — 36415 COLL VENOUS BLD VENIPUNCTURE: CPT

## 2019-05-29 PROCEDURE — C1894: CPT

## 2019-05-29 PROCEDURE — 94150 VITAL CAPACITY TEST: CPT

## 2019-05-29 PROCEDURE — 81001 URINALYSIS AUTO W/SCOPE: CPT

## 2019-05-29 PROCEDURE — 85610 PROTHROMBIN TIME: CPT

## 2019-05-29 PROCEDURE — 93308 TTE F-UP OR LMTD: CPT

## 2019-05-29 PROCEDURE — 80053 COMPREHEN METABOLIC PANEL: CPT

## 2019-05-29 PROCEDURE — 94640 AIRWAY INHALATION TREATMENT: CPT

## 2019-05-29 PROCEDURE — 94002 VENT MGMT INPAT INIT DAY: CPT

## 2019-05-29 PROCEDURE — 84295 ASSAY OF SERUM SODIUM: CPT

## 2019-05-29 PROCEDURE — 86850 RBC ANTIBODY SCREEN: CPT

## 2019-05-29 PROCEDURE — 83605 ASSAY OF LACTIC ACID: CPT

## 2019-05-29 PROCEDURE — C2618: CPT

## 2019-05-29 PROCEDURE — 84132 ASSAY OF SERUM POTASSIUM: CPT

## 2019-05-29 PROCEDURE — 85027 COMPLETE CBC AUTOMATED: CPT

## 2019-05-29 PROCEDURE — 93880 EXTRACRANIAL BILAT STUDY: CPT

## 2019-05-29 PROCEDURE — 36430 TRANSFUSION BLD/BLD COMPNT: CPT

## 2019-05-29 PROCEDURE — 86923 COMPATIBILITY TEST ELECTRIC: CPT

## 2019-05-29 PROCEDURE — 97116 GAIT TRAINING THERAPY: CPT

## 2019-05-29 PROCEDURE — 82803 BLOOD GASES ANY COMBINATION: CPT

## 2019-05-29 PROCEDURE — 93005 ELECTROCARDIOGRAM TRACING: CPT

## 2019-05-29 PROCEDURE — 84100 ASSAY OF PHOSPHORUS: CPT

## 2019-05-29 PROCEDURE — 71046 X-RAY EXAM CHEST 2 VIEWS: CPT

## 2019-05-29 PROCEDURE — 83735 ASSAY OF MAGNESIUM: CPT

## 2019-05-29 PROCEDURE — 82330 ASSAY OF CALCIUM: CPT

## 2019-05-29 PROCEDURE — 80076 HEPATIC FUNCTION PANEL: CPT

## 2019-05-29 PROCEDURE — 84443 ASSAY THYROID STIM HORMONE: CPT

## 2019-05-29 PROCEDURE — 86901 BLOOD TYPING SEROLOGIC RH(D): CPT

## 2019-05-29 PROCEDURE — P9045: CPT

## 2019-05-29 PROCEDURE — 99222 1ST HOSP IP/OBS MODERATE 55: CPT

## 2019-05-29 PROCEDURE — 71250 CT THORAX DX C-: CPT

## 2019-05-29 PROCEDURE — 83880 ASSAY OF NATRIURETIC PEPTIDE: CPT

## 2019-05-29 PROCEDURE — 83036 HEMOGLOBIN GLYCOSYLATED A1C: CPT

## 2019-05-29 PROCEDURE — 85730 THROMBOPLASTIN TIME PARTIAL: CPT

## 2019-05-29 PROCEDURE — C1889: CPT

## 2019-05-29 PROCEDURE — 85025 COMPLETE CBC W/AUTO DIFF WBC: CPT

## 2019-05-29 PROCEDURE — 84134 ASSAY OF PREALBUMIN: CPT

## 2019-05-29 PROCEDURE — 71045 X-RAY EXAM CHEST 1 VIEW: CPT

## 2019-05-29 PROCEDURE — 93010 ELECTROCARDIOGRAM REPORT: CPT

## 2019-05-29 PROCEDURE — P9017: CPT

## 2019-05-29 PROCEDURE — 80048 BASIC METABOLIC PNL TOTAL CA: CPT

## 2019-05-29 PROCEDURE — 86900 BLOOD TYPING SEROLOGIC ABO: CPT

## 2019-05-29 PROCEDURE — P9047: CPT

## 2019-05-29 PROCEDURE — 82962 GLUCOSE BLOOD TEST: CPT

## 2019-05-29 RX ORDER — FUROSEMIDE 40 MG
1 TABLET ORAL
Qty: 10 | Refills: 0
Start: 2019-05-29 | End: 2019-06-07

## 2019-05-29 RX ORDER — ALBUMIN HUMAN 25 %
50 VIAL (ML) INTRAVENOUS ONCE
Refills: 0 | Status: COMPLETED | OUTPATIENT
Start: 2019-05-29 | End: 2019-05-29

## 2019-05-29 RX ORDER — PANTOPRAZOLE SODIUM 20 MG/1
1 TABLET, DELAYED RELEASE ORAL
Qty: 30 | Refills: 0
Start: 2019-05-29 | End: 2019-06-27

## 2019-05-29 RX ORDER — POTASSIUM CHLORIDE 20 MEQ
1 PACKET (EA) ORAL
Qty: 10 | Refills: 0
Start: 2019-05-29 | End: 2019-06-07

## 2019-05-29 RX ORDER — FUROSEMIDE 40 MG
1 TABLET ORAL
Qty: 7 | Refills: 0
Start: 2019-05-29 | End: 2019-06-04

## 2019-05-29 RX ORDER — POTASSIUM CHLORIDE 20 MEQ
1 PACKET (EA) ORAL
Qty: 7 | Refills: 0
Start: 2019-05-29 | End: 2019-06-04

## 2019-05-29 RX ADMIN — SODIUM CHLORIDE 3 MILLILITER(S): 9 INJECTION INTRAMUSCULAR; INTRAVENOUS; SUBCUTANEOUS at 05:19

## 2019-05-29 RX ADMIN — CHLORHEXIDINE GLUCONATE 1 APPLICATION(S): 213 SOLUTION TOPICAL at 05:18

## 2019-05-29 RX ADMIN — Medication 81 MILLIGRAM(S): at 11:27

## 2019-05-29 RX ADMIN — Medication 25 MILLIGRAM(S): at 05:19

## 2019-05-29 RX ADMIN — Medication 50 MILLILITER(S): at 11:24

## 2019-05-29 RX ADMIN — Medication 20 MILLIEQUIVALENT(S): at 11:27

## 2019-05-29 RX ADMIN — Medication 50 MILLILITER(S): at 11:25

## 2019-05-29 RX ADMIN — PANTOPRAZOLE SODIUM 40 MILLIGRAM(S): 20 TABLET, DELAYED RELEASE ORAL at 05:19

## 2019-05-29 RX ADMIN — Medication 40 MILLIGRAM(S): at 05:19

## 2019-05-29 RX ADMIN — HEPARIN SODIUM 5000 UNIT(S): 5000 INJECTION INTRAVENOUS; SUBCUTANEOUS at 05:19

## 2019-05-29 NOTE — CONSULT NOTE ADULT - SUBJECTIVE AND OBJECTIVE BOX
Electrophysiology Consult Note:     CHIEF COMPLAINT:  Patient is a 78y old  Female who presents with a chief complaint of Mitral Regurgitation (29 May 2019 09:02)    HISTORY OF PRESENT ILLNESS:   HPI:  77 y/o female with PMHx of HTN, s/p ablation at [facility] in 2018 for >17,000 PVC on holter monitor now presents for her known mitral valve regurgitation/prolapse. Patient reports worsening MO and exercise tolerance of 2 blocks. She had a + stress test in Jan 2018 and a LHC on 2/12/18 showing normal coronary arteries. Her last echo on 4/24 showed severe MR with prolapse of the posterior mitral leaflet. and severe eccentric MR, and mild TR, normal LV funciton and severe LA enlargement. the degree of MR had significantly worsened from echo's done in 2018 and 2017. She was seen by Dr. Kent in the office and was found to be a surgical candidate. She now presents for her completion of her preoperative workup including carotid duples, xray, non contrast chest CT. She states that she has been in her usual state of health. She states that she has some fatigue and MO after walking a few blocks. She denies any CP, palpitations, orthopnea, LE edema, fevers/chills, nausea/vomiting. (21 May 2019 16:43).  On 5/22/19 she underwent mini thoracotomy MV Repair, cryomaze and LESLIE exclusion.     EP consulted for atrial flutter on telemetry.    PAST MEDICAL & SURGICAL HISTORY:  Epilepsy  Macular degeneration  Arrhythmia  HTN (hypertension)  Former smoker  No significant past surgical history      FAMILY HISTORY:  No pertinent family history in first degree relatives      SOCIAL HISTORY:    [ ] Non-smoker  [ ] Smoker  [ ] Alcohol  [ ] illicit drugs    Allergies    No Known Allergies    Intolerances    	    MEDICATIONS:  MEDICATIONS  (STANDING):  aspirin enteric coated 81 milliGRAM(s) Oral daily  atorvastatin 20 milliGRAM(s) Oral at bedtime  chlorhexidine 2% Cloths 1 Application(s) Topical <User Schedule>  furosemide    Tablet 40 milliGRAM(s) Oral daily  heparin  Injectable 5000 Unit(s) SubCutaneous every 12 hours  metoprolol tartrate 25 milliGRAM(s) Oral every 12 hours  pantoprazole    Tablet 40 milliGRAM(s) Oral before breakfast  potassium chloride    Tablet ER 20 milliEquivalent(s) Oral daily  PreserVision Eye Vitamin tablet 2 Capsule(s) 2 Capsule(s) Oral daily  sodium chloride 0.9% lock flush 3 milliLiter(s) IV Push every 8 hours    MEDICATIONS  (PRN):  acetaminophen   Tablet .. 650 milliGRAM(s) Oral every 6 hours PRN Mild Pain (1 - 3)  artificial  tears Solution 1 Drop(s) Both EYES every 3 hours PRN Dry Eyes        REVIEW OF SYSTEMS:  CONSTITUTIONAL: No fever, weight loss, or fatigue  EYES: No eye pain, visual disturbances, or discharge  ENMT:  No difficulty hearing, tinnitus, vertigo; No sinus or throat pain  NECK: No pain or stiffness  BREASTS: No pain, masses, or nipple discharge  RESPIRATORY: No cough, wheezing, chills or hemoptysis; No Shortness of Breath  CARDIOVASCULAR: No chest pain, palpitations, dizziness, or leg swelling  GASTROINTESTINAL: No abdominal or epigastric pain. No nausea, vomiting, or hematemesis; No diarrhea or constipation. No melena or hematochezia.  GENITOURINARY: No dysuria, frequency, hematuria, or incontinence  NEUROLOGICAL: No headaches, memory loss, loss of strength, numbness, or tremors  SKIN: No itching, burning, rashes, or lesions   LYMPH Nodes: No enlarged glands  ENDOCRINE: No heat or cold intolerance; No hair loss  MUSCULOSKELETAL: No joint pain or swelling; No muscle, back, or extremity pain  PSYCHIATRIC: No depression, anxiety, mood swings, or difficulty sleeping  HEME/LYMPH: No easy bruising, or bleeding gums  ALLERY AND IMMUNOLOGIC: No hives or eczema	      PHYSICAL EXAM:  Vital Signs Last 24 Hrs  T(C): 36.5 (29 May 2019 09:00), Max: 36.9 (29 May 2019 01:01)  T(F): 97.7 (29 May 2019 09:00), Max: 98.5 (29 May 2019 01:01)  HR: 98 (29 May 2019 11:47) (94 - 120)  BP: 102/51 (29 May 2019 11:47) (79/52 - 116/50)  BP(mean): 65 (29 May 2019 11:47) (62 - 79)  RR: 18 (29 May 2019 11:47) (14 - 18)  SpO2: 96% (29 May 2019 11:21) (96% - 100%)  Daily     Daily     Constitutional: NAD	  HEENT:   Normal oral mucosa, PERRL, EOMI	  CVS: Normal S1 S2, No JVD, No murmurs, No edema  Pulm: Lungs clear to auscultation	  GI:  Soft, Non-tender, + BS	  Ext: No LE edema  Vascular: Peripheral pulses palpable 2+ bilaterally  MS: full range of motion in all joints  Neurologic: A&O x 3, Non-focal  Psych: Pleasant, has good insight  Skin: No rash or lesion       	  LABS:	                         10.1   8.78  )-----------( 276      ( 29 May 2019 05:50 )             31.1     05-29    142  |  103  |  20  ----------------------------<  95  4.2   |  28  |  0.54    Ca    9.3      29 May 2019 05:50  Phos  3.6     05-29  Mg     2.1     05-29      proBNP:   Lipid Profile:   HgA1c:   TSH: 	      EKG: < from: 12 Lead ECG (05.29.19 @ 07:00) >  Ventricular Rate 115 BPM    Atrial Rate 115 BPM    P-R Interval 172 ms    QRS Duration 98 ms    Q-T Interval 336 ms    QTC Calculation(Bezet) 464 ms    P Axis 50 degrees    R Axis -3 degrees    T Axis 76 degrees    Diagnosis Line Sinus tachycardia  Left ventricular hypertrophy with repolarization abnormality    < end of copied text >    Telemetry: ST    Echo: < from: TTE Echo Limited or F/U (05.28.19 @ 18:10) >  Interpretation Summary  Patient tachycardic during the study. Limired study to evaluate for   pericardial   effusion. The right ventricular systolic function is normal.The left   ventricular ejection fraction is estimated to be 45-50% on limited views.   There is no pericardial effusion.  Procedure Details  A two-dimensional transthoracic echocardiogram with color flow and Doppler  was performed in limited views only.  Study Quality: Poor.  Left Ventricle  The left ventricular ejection fraction is estimated to be 45-50%  Right Ventricle  The right ventricular systolic function is normal.  Arteries and Venous System  The inferior vena cava is normal in size (<2.1 cm) with normal inspiratory  collapse (>50%) consistent with normal right atrial pressure.  Pericardium / Pleura  There is no pericardial effusion.    < end of copied text >

## 2019-05-29 NOTE — PROGRESS NOTE ADULT - ASSESSMENT
This is a 79 y/o female with PMHx of HTN, s/p ablation at [facility] in 2018 for PVCs found on holter monitor, who presented out-patient for known mitral valve regurgitation/prolapse, and was admitting to MO.  She was found to have severe MR, progressively worsening on serial CXR's.  On 5/22/19 she underwent mini thoracotomy MV Repair, cryomaze and LESLIE exclusion.  Did not require any vasopressors/inotropes and she was extubated POD #0.  She was ultimately de-lined and her surgical CT was removed.  She developed a pleural effusion on POD #2 and had a right pigtail placed draining ~600cc.  It has since been removed.  She has been ambulating and eating well w/o issues.  Today, POD #5 she remains stable and was transferred to [facility].  She has reaccumulated a small pleural effusion, however it does not warrant a pigtail at this time.      Plan:   Neuro: Pain controlled.  Ordered for Tylenol PRN.  No focal deficits.  CV: HR high 90's, SBP around 105.  Titrate up beta blockers as tolerated.  Continue w/ statin, ASA.  Lungs: CXR reviewed this am.  ?Possibly re-ultrasound right chest tomorrow if clinically indicated.  On room air currently.  O2 saturation stable.  GI: PO diet, tolerates well.  On Protonix.  Having BM's.    : Creatinine stable, making adequate UOP. Continue w/ Lasix PO standing, with K+ supplement.  Check e-lytes tomorrow am labs.    ID: No issues.  IV sites clean.    Heme: H/H stable.  On SC heparin.     Dispo: Home tomorrow.
-Please follow up with Dr. Kent on 6/4/19 at 11:00 am.  The office is located at [facility], [facility], 4th floor. Call us with any questions #[phone].  -Please follow up with your cardiologist, Dr. Pascual Hernandez, on 6/7/19 at 9:45 am.  -Walk daily as tolerated and use your incentive spirometer every hour.  -No driving or strenuous activity/exercise for 6 weeks, or until cleared by your surgeon.  -You may shower.  Be sure to gently clean your incisions with anti-bacterial soap and water daily, pat dry.  You may leave them open to air.  -Call your doctor if you have shortness of breath, chest pain not relieved by pain medication, dizziness, fever >101.5, or increased redness or drainage from incisions.
This is a 79 y/o female with PMHx of HTN, s/p ablation at [facility] in 2018 for PVCs found on holter monitor, who presented out-patient for known mitral valve regurgitation/prolapse, and was admitting to [place].  She was found to have severe MR, progressively worsening on serial CXR's.  On 5/22/19 she underwent mini thoracotomy MV Repair, cryomaze and LESLIE exclusion.  Did not require any vasopressors/inotropes and she was extubated POD #0.  She was ultimately de-lined and her surgical CT was removed.  She developed a pleural effusion on POD #2 and had a right pigtail placed draining ~600cc.  It has since been removed.  She has been ambulating and eating well w/o issues. POD #5 she was transferred to [facility].  Tody POD #6 she has reaccumulated a small pleural effusion, started on diuretics, and stable.  Originally planned for discharge today but this afternoon after getting up to use the rest room HR consistently ST 120s, previously NSR 80s-90s.  Given extra BB with no improvement.  TTE pending.      Neurovascular: Pain well controlled with current regimen.  -con't tylenol prn    Cardiovascular: s/p mini mitral valve repair.    -con't ASA and lipitor   -con't BB 25 mg BID, no room to titrate up 2/2 soft pressure  -tachycardia and low BP post wire removal, bedside echo done to r/o effusion, results pending     Respiratory: saturating well on room air  -Encourage C+DB and Use of IS 10x / hr while awake.  -PA/lat with stable right effusion, confirmed on bedside US, no large enough for pigtail, continue diuresis with lasix.     GI: No active issues, + BMs  -PPX with protonix  -PO Diet.    Renal / : No active issues  -Monitor renal function.  -Monitor I/O's.  -con't K supplement with diuresis     Endocrine:  no active issues  -monitor glucose levels    Hematologic: no active issues  -HSQ for DVT ppx    ID: remains afebrile  -monitor for post op fevers  -Observe for SIRS/Sepsis Syndrome.    Disposition:  -home tomorrow if HR better controlled.

## 2019-05-29 NOTE — PROGRESS NOTE ADULT - SUBJECTIVE AND OBJECTIVE BOX
Patient discussed on morning rounds with Dr. Kent    Operation / Date: 5/22/19 Mini MV repair, cryomaze, LESLIE clip, EF Nl    Surgeon: Dr. Kent    Referring Physician: Dr. Hernandez    SUBJECTIVE ASSESSMENT:  78y Female seen and examined. Offers no acute complaints, wants to go home. She is ambulating on RA, using iS pulling 1000cc, tolerating PO Diet, +BMs yesterday. Denies fever, chest pain, palpitations, SOB, abdominal pain, n/v.     Hospital Course:  This is a 77 y/o female with PMHx of HTN, s/p ablation at [facility] in 2018 for PVCs found on holter monitor, who presented out-patient for known mitral valve regurgitation/prolapse, and was admitting to [place].  She was found to have severe MR. On 5/22/19 she underwent mini thoracotomy MV Repair, cryomaze and LESLIE exclusion.  Did not require any vasopressors/inotropes and she was extubated POD #0.  She was ultimately de-lined and her surgical CT was removed on POD #1.  She developed a right pleural effusion on POD #2 and had pigtail placed draining ~600cc.  She continued to improve, delined POD3. She was transferred to floor care on POD #5.  She has been ambulating on room air and eating well w/o issues. POD #6 PA/lat revealed reaccumulation of a small right sided pleural effusion.  Effusion was confirmed on bedside US, measuring < 200 cc, and she was started on lasix. D/c was cancelled 2/2 sinus tachycardia, echo revealed no pericardial effusion. Today POD7, patient offers no acute complaints. She is ambulating on RA, using IS pulling 1000cc, tolerating PO diet, +BMs. AM EKG reviewed with Dr. Hunter confirmed Sinus tachycardia w/ 1st degree AV block. BB unable to be titrated  2/2 soft pressures.  Per Dr. Kent patient is ready for discharge on Metoprolol 25mg BID, asa, no AC, and lasix.      35 minutes was spent with the patient reviewing the discharge material including medications, follow up appointments, recovery, concerning symptoms, and how to contact their health care providers if they have questions.     Vital Signs Last 24 Hrs  T(C): 36.3 (29 May 2019 05:01), Max: 36.9 (29 May 2019 01:01)  T(F): 97.3 (29 May 2019 05:01), Max: 98.5 (29 May 2019 01:01)  HR: 114 (29 May 2019 06:50) (95 - 126)  BP: 104/64 (29 May 2019 05:29) (90/49 - 116/57)  BP(mean): 78 (29 May 2019 05:29) (66 - 79)  RR: 15 (29 May 2019 05:29) (14 - 16)  SpO2: 100% (29 May 2019 05:29) (97% - 100%)  I&O's Detail    28 May 2019 07:01  -  29 May 2019 07:00  --------------------------------------------------------  IN:    Albumin 5% - 50 mL: 250 mL    Oral Fluid: 700 mL  Total IN: 950 mL    OUT:    Voided: 850 mL  Total OUT: 850 mL    Total NET: 100 mL      EPICARDIAL WIRES REMOVED: Yes  TIE DOWNS REMOVED: Yes    PHYSICAL EXAM:    General: Patient lying comfortably in bed, no acute distress     Neurological: Alert and oriented. No focal neurological deficits     Cardiovascular: S1S2, RRR, no murmurs appreciated on exam     Respiratory: slightly decreased R base, no wheeze/rhonchi/rales    Gastrointestinal: + BS, soft, non tender, non distended     Extremities: Warm and well perfused. No edema, no calf tenderness     Vascular: 2+ Peripheral pulses b/l     Incision Sites: R thoracotomy: CDI, + moderate ecchymosis, no signs of infection/drainage/dehiscence. No hematoma palpabled. R groin cut down: CDI.     LABS:                        10.1   8.78  )-----------( 276      ( 29 May 2019 05:50 )             31.1       COUMADIN:  No        05-29    142  |  103  |  20  ----------------------------<  95  4.2   |  28  |  0.54    Ca    9.3      29 May 2019 05:50  Phos  3.6     05-29  Mg     2.1     05-29            MEDICATIONS  (STANDING):  SEE MED REC      Discharge CXR:  < from: Xray Chest 2 Views PA/Lat (05.28.19 @ 10:42) >    FINDINGS: The lungs are clear. Small bilateral pleural effusions.   Cardiomegaly..Mitral valve annular calcification. Status post left atrial   appendage clipping. Severe arthropathy left glenohumeral joint.    IMPRESSION: Small bilateral pleural effusions. Cardiomegaly.    < end of copied text >      Discharge ECHO:  < from: TTE Echo Limited or F/U (05.28.19 @ 18:10) >  Patient tachycardic during the study. Limired study to evaluate for   pericardial   effusion. The right ventricular systolic function is normal.The left   ventricular ejection fraction is estimated to be 45-50% on limited views.   There is no pericardial effusion.    < end of copied text >

## 2019-05-29 NOTE — PROGRESS NOTE ADULT - PROVIDER SPECIALTY LIST ADULT
CT Surgery
CT Surgery
Critical Care
CT Surgery
CT Surgery

## 2019-05-29 NOTE — PROGRESS NOTE ADULT - REASON FOR ADMISSION
Mitral Regurgitation
Mitral Regurgitation
MVR
Mitral Regurgitation

## 2019-05-29 NOTE — CONSULT NOTE ADULT - ASSESSMENT
77 y/o female with PMHx of HTN, s/p ablation at [facility] in 2018 for >17,000 PVC on holter monitor who presented for her known mitral valve regurgitation/prolapse,now s/p mini thoracotomy MV Repair, cryomaze and LESLIE exclusion (5/22/19). EP consulted for concern for atrial flutter on telemetry.  Review of ECG's and telemetry notable for sinus tachycardia 110-120.  No evidence of arrythmia. No EP intervention required at this time.

## 2019-05-30 PROBLEM — Z98.890 S/P MITRAL VALVE REPAIR: Status: ACTIVE | Noted: 2019-05-30

## 2019-05-30 PROBLEM — Z09 POSTOP CHECK: Status: ACTIVE | Noted: 2019-05-30

## 2019-05-30 PROBLEM — Z98.890 STATUS POST CRYOABLATION: Status: ACTIVE | Noted: 2019-05-30

## 2019-05-30 RX ORDER — PANTOPRAZOLE 40 MG/1
40 TABLET, DELAYED RELEASE ORAL DAILY
Refills: 0 | Status: ACTIVE | COMMUNITY

## 2019-05-30 RX ORDER — METOPROLOL SUCCINATE 50 MG/1
50 TABLET, EXTENDED RELEASE ORAL
Refills: 0 | Status: COMPLETED | COMMUNITY
Start: 2018-03-12 | End: 2019-05-30

## 2019-05-30 RX ORDER — FUROSEMIDE 20 MG/1
20 TABLET ORAL DAILY
Qty: 30 | Refills: 0 | Status: ACTIVE | COMMUNITY

## 2019-05-30 RX ORDER — AMLODIPINE BESYLATE 5 MG/1
5 TABLET ORAL DAILY
Qty: 30 | Refills: 6 | Status: COMPLETED | COMMUNITY
End: 2019-05-30

## 2019-05-30 RX ORDER — POTASSIUM CHLORIDE 750 MG/1
10 CAPSULE, EXTENDED RELEASE ORAL
Qty: 30 | Refills: 0 | Status: ACTIVE | COMMUNITY

## 2019-05-30 RX ORDER — METOPROLOL TARTRATE 25 MG/1
25 TABLET, FILM COATED ORAL
Refills: 0 | Status: ACTIVE | COMMUNITY

## 2019-06-03 ENCOUNTER — FORM ENCOUNTER (OUTPATIENT)
Age: 79
End: 2019-06-03

## 2019-06-04 ENCOUNTER — OUTPATIENT (OUTPATIENT)
Dept: OUTPATIENT SERVICES | Facility: HOSPITAL | Age: 79
LOS: 1 days | End: 2019-06-04
Payer: MEDICARE

## 2019-06-04 ENCOUNTER — APPOINTMENT (OUTPATIENT)
Dept: CARDIOTHORACIC SURGERY | Facility: CLINIC | Age: 79
End: 2019-06-04
Payer: MEDICARE

## 2019-06-04 VITALS
SYSTOLIC BLOOD PRESSURE: 123 MMHG | RESPIRATION RATE: 18 BRPM | BODY MASS INDEX: 20.14 KG/M2 | DIASTOLIC BLOOD PRESSURE: 58 MMHG | HEART RATE: 94 BPM | HEIGHT: 64 IN | OXYGEN SATURATION: 97 % | TEMPERATURE: 97 F | WEIGHT: 118 LBS

## 2019-06-04 DIAGNOSIS — Z09 ENCOUNTER FOR FOLLOW-UP EXAMINATION AFTER COMPLETED TREATMENT FOR CONDITIONS OTHER THAN MALIGNANT NEOPLASM: ICD-10-CM

## 2019-06-04 DIAGNOSIS — Z98.890 OTHER SPECIFIED POSTPROCEDURAL STATES: ICD-10-CM

## 2019-06-04 PROCEDURE — 71046 X-RAY EXAM CHEST 2 VIEWS: CPT

## 2019-06-04 PROCEDURE — 71046 X-RAY EXAM CHEST 2 VIEWS: CPT | Mod: 26

## 2019-06-04 PROCEDURE — 99024 POSTOP FOLLOW-UP VISIT: CPT

## 2019-06-07 DIAGNOSIS — G40.909 EPILEPSY, UNSPECIFIED, NOT INTRACTABLE, WITHOUT STATUS EPILEPTICUS: ICD-10-CM

## 2019-06-07 DIAGNOSIS — J90 PLEURAL EFFUSION, NOT ELSEWHERE CLASSIFIED: ICD-10-CM

## 2019-06-07 DIAGNOSIS — Z87.891 PERSONAL HISTORY OF NICOTINE DEPENDENCE: ICD-10-CM

## 2019-06-07 DIAGNOSIS — I34.1 NONRHEUMATIC MITRAL (VALVE) PROLAPSE: ICD-10-CM

## 2019-06-07 DIAGNOSIS — H35.30 UNSPECIFIED MACULAR DEGENERATION: ICD-10-CM

## 2019-06-07 DIAGNOSIS — I48.0 PAROXYSMAL ATRIAL FIBRILLATION: ICD-10-CM

## 2019-06-07 DIAGNOSIS — Z79.82 LONG TERM (CURRENT) USE OF ASPIRIN: ICD-10-CM

## 2019-06-07 DIAGNOSIS — J94.2 HEMOTHORAX: ICD-10-CM

## 2019-06-07 DIAGNOSIS — I10 ESSENTIAL (PRIMARY) HYPERTENSION: ICD-10-CM

## 2019-06-07 DIAGNOSIS — I48.2 CHRONIC ATRIAL FIBRILLATION: ICD-10-CM

## 2019-06-07 DIAGNOSIS — I47.1 SUPRAVENTRICULAR TACHYCARDIA: ICD-10-CM

## 2019-06-07 DIAGNOSIS — D62 ACUTE POSTHEMORRHAGIC ANEMIA: ICD-10-CM

## 2019-07-02 ENCOUNTER — APPOINTMENT (OUTPATIENT)
Dept: CARDIOTHORACIC SURGERY | Facility: CLINIC | Age: 79
End: 2019-07-02
Payer: MEDICARE

## 2019-07-02 VITALS
OXYGEN SATURATION: 96 % | SYSTOLIC BLOOD PRESSURE: 130 MMHG | HEIGHT: 64 IN | DIASTOLIC BLOOD PRESSURE: 66 MMHG | BODY MASS INDEX: 19.81 KG/M2 | RESPIRATION RATE: 19 BRPM | HEART RATE: 86 BPM | WEIGHT: 116 LBS | TEMPERATURE: 96.2 F

## 2019-07-02 PROCEDURE — 99024 POSTOP FOLLOW-UP VISIT: CPT

## 2020-07-02 NOTE — ASSESSMENT
[FreeTextEntry1] : 81 yo female status post Minimally invasive MV repair w/ 31 simulus band and Cryoablation MAZE on 5/22/19 presents for 1 year follow up visit.

## 2020-07-02 NOTE — CONSULT LETTER
[Please see my note below.] : Please see my note below. [Dear  ___] : Dear  [unfilled], [FreeTextEntry2] : JOSE GORDILLO MD\par   [Sincerely,] : Sincerely, [FreeTextEntry1] : LIZZETTE SANCHEZ was seen today for a 1 year follow up visit.  She is doing well status post Minimally invasive MV repair and cryoablation MAZE on May 22, 2019. We have asked that the patient followup in your office. We have instructed the patient to return to see us in 1year. Please contact our office for any questions or concerns at [phone].\par \par Thank you for allowing us to participate in this patients care.\par  [FreeTextEntry3] : Joao Kent MD, FRCS\par \par [facility] \par Department of Cardiothoracic  Surgery \par Director of Robotic Cardiac Surgery\par Professor of Cardiovascular & Thoracic Surgery\par [employer] \par \par [facility]\par

## 2020-07-07 ENCOUNTER — APPOINTMENT (OUTPATIENT)
Dept: CARDIOTHORACIC SURGERY | Facility: CLINIC | Age: 80
End: 2020-07-07

## 2022-04-19 NOTE — HISTORY OF PRESENT ILLNESS
[FreeTextEntry1] : 79 yo female with a history of HTN,  s/p ablation at [facility] in 2018 s/t >17,000 PVC's on Holter and mitral valve prolapse followed with serial echocardiograms now presents with severe MR. Patient had + stress test test in January 2018 and s/p LHC on 2/12/18 that revealed normal coronary arteries. Patient reports worsening MO, with exercise tolerance of 2 blocks. She denies c/o chest pain, sob at rest, fever, lower extremity edema, syncope or palpitations. Echo on 4/24/19 revealed severe MR , normal LV function and severe LA enlargement. The degree of MR has significantly worsening as compared to echo in 2017 and 2018. She presents for surgical consult accompanied by her . She appears generally well, NAD. She reports performing ADL's with assistance from her . NYHA 2.  No

## 2022-08-15 NOTE — H&P CARDIOLOGY - LIVES WITH, PROFILE
spouse Ketoconazole Counseling:   Patient counseled regarding improving absorption with orange juice.  Adverse effects include but are not limited to breast enlargement, headache, diarrhea, nausea, upset stomach, liver function test abnormalities, taste disturbance, and stomach pain.  There is a rare possibility of liver failure that can occur when taking ketoconazole. The patient understands that monitoring of LFTs may be required, especially at baseline. The patient verbalized understanding of the proper use and possible adverse effects of ketoconazole.  All of the patient's questions and concerns were addressed.

## 2023-05-23 NOTE — DIETITIAN INITIAL EVALUATION ADULT. - OTHER INFO
Diet, Regular (05-23-23 @ 08:44) [Active]       Pt seen for initial assessment. 79 y/o female with PMHx of HTN, s/p ablation, now presents for her known mitral valve regurgitation/prolapse. S/P mitral valve repair w/ MANUEL w/ clipping of left atrial appendage 5/22, extubated. Skin: 1+ generalized edema 5/23, +surgical incision. Pt seen resting in bed, awake, alert,  at bedside. Pt is on DASH/TLC diet with % PO intake. Reported UBW 54.5kg, stable >6 months, current wt 54kg. NKFA. Denies N/V/D, reports lack of BM, last 2-3 days ago. Not reporting pain at this time. RD to follow up per protocol.